# Patient Record
Sex: FEMALE | Race: WHITE | NOT HISPANIC OR LATINO | Employment: UNEMPLOYED | URBAN - METROPOLITAN AREA
[De-identification: names, ages, dates, MRNs, and addresses within clinical notes are randomized per-mention and may not be internally consistent; named-entity substitution may affect disease eponyms.]

---

## 2017-03-16 ENCOUNTER — APPOINTMENT (OUTPATIENT)
Dept: GENERAL RADIOLOGY | Facility: HOSPITAL | Age: 57
End: 2017-03-16

## 2017-03-16 ENCOUNTER — HOSPITAL ENCOUNTER (INPATIENT)
Facility: HOSPITAL | Age: 57
LOS: 6 days | Discharge: NURSING, REHAB OR LONG-TERM CARE | End: 2017-03-22
Attending: EMERGENCY MEDICINE | Admitting: HOSPITALIST

## 2017-03-16 DIAGNOSIS — J18.9 HAP (HOSPITAL-ACQUIRED PNEUMONIA): ICD-10-CM

## 2017-03-16 DIAGNOSIS — J96.21 ACUTE ON CHRONIC RESPIRATORY FAILURE WITH HYPOXIA (HCC): Primary | ICD-10-CM

## 2017-03-16 DIAGNOSIS — R53.1 GENERALIZED WEAKNESS: ICD-10-CM
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
## 2017-03-16 PROBLEM — E11.9 DIABETES MELLITUS (HCC): Status: ACTIVE | Noted: 2017-03-16

## 2017-03-16 PROBLEM — B19.20 HEPATITIS C: Status: ACTIVE | Noted: 2017-03-16

## 2017-03-16 PROBLEM — Z93.0 TRACHEOSTOMY IN PLACE (HCC): Status: ACTIVE | Noted: 2017-03-16

## 2017-03-16 PROBLEM — I48.91 ATRIAL FIBRILLATION (HCC): Status: ACTIVE | Noted: 2017-03-16

## 2017-03-16 PROBLEM — E78.5 HYPERLIPIDEMIA: Status: ACTIVE | Noted: 2017-03-16

## 2017-03-16 PROBLEM — N28.9 RENAL DISORDER: Status: ACTIVE | Noted: 2017-03-16

## 2017-03-16 PROBLEM — I10 HYPERTENSION: Status: ACTIVE | Noted: 2017-03-16

## 2017-03-16 PROBLEM — I50.9 CHF (CONGESTIVE HEART FAILURE) (HCC): Status: ACTIVE | Noted: 2017-03-16

## 2017-03-16 PROBLEM — G47.33 SLEEP APNEA, OBSTRUCTIVE: Status: ACTIVE | Noted: 2017-03-16

## 2017-03-16 LAB
ALBUMIN SERPL-MCNC: 3.8 G/DL (ref 3.5–5.2)
ALBUMIN/GLOB SERPL: 1 G/DL
ALP SERPL-CCNC: 93 U/L (ref 39–117)
ALT SERPL W P-5'-P-CCNC: 15 U/L (ref 1–33)
ANION GAP SERPL CALCULATED.3IONS-SCNC: 13 MMOL/L
ANION GAP SERPL CALCULATED.3IONS-SCNC: 13.9 MMOL/L
AST SERPL-CCNC: 14 U/L (ref 1–32)
B PERT DNA SPEC QL NAA+PROBE: NOT DETECTED
BASOPHILS # BLD AUTO: 0.04 10*3/MM3 (ref 0–0.2)
BASOPHILS # BLD AUTO: 0.05 10*3/MM3 (ref 0–0.2)
BASOPHILS NFR BLD AUTO: 0.5 % (ref 0–1.5)
BASOPHILS NFR BLD AUTO: 0.6 % (ref 0–1.5)
BILIRUB SERPL-MCNC: 0.2 MG/DL (ref 0.1–1.2)
BILIRUB UR QL STRIP: NEGATIVE
BUN BLD-MCNC: 49 MG/DL (ref 6–20)
BUN BLD-MCNC: 53 MG/DL (ref 6–20)
BUN/CREAT SERPL: 20.5 (ref 7–25)
BUN/CREAT SERPL: 23.2 (ref 7–25)
C PNEUM DNA NPH QL NAA+NON-PROBE: NOT DETECTED
CALCIUM SPEC-SCNC: 9.5 MG/DL (ref 8.6–10.5)
CALCIUM SPEC-SCNC: 9.6 MG/DL (ref 8.6–10.5)
CHLORIDE SERPL-SCNC: 98 MMOL/L (ref 98–107)
CHLORIDE SERPL-SCNC: 98 MMOL/L (ref 98–107)
CLARITY UR: ABNORMAL
CO2 SERPL-SCNC: 28 MMOL/L (ref 22–29)
CO2 SERPL-SCNC: 28.1 MMOL/L (ref 22–29)
COLOR UR: YELLOW
CREAT BLD-MCNC: 2.28 MG/DL (ref 0.57–1)
CREAT BLD-MCNC: 2.39 MG/DL (ref 0.57–1)
D-LACTATE SERPL-SCNC: 1.4 MMOL/L (ref 0.5–2)
DEPRECATED RDW RBC AUTO: 54.4 FL (ref 37–54)
DEPRECATED RDW RBC AUTO: 55.5 FL (ref 37–54)
EOSINOPHIL # BLD AUTO: 0.24 10*3/MM3 (ref 0–0.7)
EOSINOPHIL # BLD AUTO: 0.27 10*3/MM3 (ref 0–0.7)
EOSINOPHIL NFR BLD AUTO: 2.9 % (ref 0.3–6.2)
EOSINOPHIL NFR BLD AUTO: 3.6 % (ref 0.3–6.2)
ERYTHROCYTE [DISTWIDTH] IN BLOOD BY AUTOMATED COUNT: 16.9 % (ref 11.7–13)
ERYTHROCYTE [DISTWIDTH] IN BLOOD BY AUTOMATED COUNT: 17.1 % (ref 11.7–13)
FLUAV H1 2009 PAND RNA NPH QL NAA+PROBE: NOT DETECTED
FLUAV H1 HA GENE NPH QL NAA+PROBE: NOT DETECTED
FLUAV H3 RNA NPH QL NAA+PROBE: NOT DETECTED
FLUAV SUBTYP SPEC NAA+PROBE: NOT DETECTED
FLUBV RNA ISLT QL NAA+PROBE: NOT DETECTED
GFR SERPL CREATININE-BSD FRML MDRD: 21 ML/MIN/1.73
GFR SERPL CREATININE-BSD FRML MDRD: 22 ML/MIN/1.73
GLOBULIN UR ELPH-MCNC: 3.7 GM/DL
GLUCOSE BLD-MCNC: 131 MG/DL (ref 65–99)
GLUCOSE BLD-MCNC: 150 MG/DL (ref 65–99)
GLUCOSE BLDC GLUCOMTR-MCNC: 116 MG/DL (ref 70–130)
GLUCOSE BLDC GLUCOMTR-MCNC: 140 MG/DL (ref 70–130)
GLUCOSE BLDC GLUCOMTR-MCNC: 197 MG/DL (ref 70–130)
GLUCOSE UR STRIP-MCNC: NEGATIVE MG/DL
HADV DNA SPEC NAA+PROBE: NOT DETECTED
HBA1C MFR BLD: 4.9 % (ref 4.8–5.6)
HCOV 229E RNA SPEC QL NAA+PROBE: NOT DETECTED
HCOV HKU1 RNA SPEC QL NAA+PROBE: NOT DETECTED
HCOV NL63 RNA SPEC QL NAA+PROBE: NOT DETECTED
HCOV OC43 RNA SPEC QL NAA+PROBE: NOT DETECTED
HCT VFR BLD AUTO: 36.6 % (ref 35.6–45.5)
HCT VFR BLD AUTO: 37.2 % (ref 35.6–45.5)
HGB BLD-MCNC: 11.6 G/DL (ref 11.9–15.5)
HGB BLD-MCNC: 11.7 G/DL (ref 11.9–15.5)
HGB UR QL STRIP.AUTO: NEGATIVE
HMPV RNA NPH QL NAA+NON-PROBE: NOT DETECTED
HPIV1 RNA SPEC QL NAA+PROBE: NOT DETECTED
HPIV2 RNA SPEC QL NAA+PROBE: NOT DETECTED
HPIV3 RNA NPH QL NAA+PROBE: NOT DETECTED
HPIV4 P GENE NPH QL NAA+PROBE: NOT DETECTED
IMM GRANULOCYTES # BLD: 0 10*3/MM3 (ref 0–0.03)
IMM GRANULOCYTES # BLD: 0.03 10*3/MM3 (ref 0–0.03)
IMM GRANULOCYTES NFR BLD: 0 % (ref 0–0.5)
IMM GRANULOCYTES NFR BLD: 0.4 % (ref 0–0.5)
KETONES UR QL STRIP: NEGATIVE
LEUKOCYTE ESTERASE UR QL STRIP.AUTO: NEGATIVE
LYMPHOCYTES # BLD AUTO: 1.74 10*3/MM3 (ref 0.9–4.8)
LYMPHOCYTES # BLD AUTO: 1.78 10*3/MM3 (ref 0.9–4.8)
LYMPHOCYTES NFR BLD AUTO: 20.8 % (ref 19.6–45.3)
LYMPHOCYTES NFR BLD AUTO: 23.8 % (ref 19.6–45.3)
M PNEUMO IGG SER IA-ACNC: NOT DETECTED
MAGNESIUM SERPL-MCNC: 2 MG/DL (ref 1.6–2.6)
MCH RBC QN AUTO: 28 PG (ref 26.9–32)
MCH RBC QN AUTO: 28.2 PG (ref 26.9–32)
MCHC RBC AUTO-ENTMCNC: 31.5 G/DL (ref 32.4–36.3)
MCHC RBC AUTO-ENTMCNC: 31.7 G/DL (ref 32.4–36.3)
MCV RBC AUTO: 88.8 FL (ref 80.5–98.2)
MCV RBC AUTO: 89 FL (ref 80.5–98.2)
MONOCYTES # BLD AUTO: 0.5 10*3/MM3 (ref 0.2–1.2)
MONOCYTES # BLD AUTO: 0.52 10*3/MM3 (ref 0.2–1.2)
MONOCYTES NFR BLD AUTO: 6 % (ref 5–12)
MONOCYTES NFR BLD AUTO: 7 % (ref 5–12)
NEUTROPHILS # BLD AUTO: 4.86 10*3/MM3 (ref 1.9–8.1)
NEUTROPHILS # BLD AUTO: 5.79 10*3/MM3 (ref 1.9–8.1)
NEUTROPHILS NFR BLD AUTO: 65.1 % (ref 42.7–76)
NEUTROPHILS NFR BLD AUTO: 69.3 % (ref 42.7–76)
NITRITE UR QL STRIP: NEGATIVE
NT-PROBNP SERPL-MCNC: 468.9 PG/ML (ref 0–900)
PH UR STRIP.AUTO: <=5 [PH] (ref 5–8)
PLATELET # BLD AUTO: 217 10*3/MM3 (ref 140–500)
PLATELET # BLD AUTO: 266 10*3/MM3 (ref 140–500)
PMV BLD AUTO: 9.7 FL (ref 6–12)
PMV BLD AUTO: 9.7 FL (ref 6–12)
POTASSIUM BLD-SCNC: 4.4 MMOL/L (ref 3.5–5.2)
POTASSIUM BLD-SCNC: 4.5 MMOL/L (ref 3.5–5.2)
POTASSIUM BLD-SCNC: 4.8 MMOL/L (ref 3.5–5.2)
PROT SERPL-MCNC: 7.5 G/DL (ref 6–8.5)
PROT UR QL STRIP: NEGATIVE
RBC # BLD AUTO: 4.12 10*6/MM3 (ref 3.9–5.2)
RBC # BLD AUTO: 4.18 10*6/MM3 (ref 3.9–5.2)
RHINOVIRUS RNA SPEC NAA+PROBE: NOT DETECTED
RSV RNA NPH QL NAA+NON-PROBE: NOT DETECTED
SODIUM BLD-SCNC: 139 MMOL/L (ref 136–145)
SODIUM BLD-SCNC: 140 MMOL/L (ref 136–145)
SP GR UR STRIP: 1.02 (ref 1–1.03)
TROPONIN T SERPL-MCNC: <0.01 NG/ML (ref 0–0.03)
TSH SERPL DL<=0.05 MIU/L-ACNC: 2.48 MIU/ML (ref 0.27–4.2)
UROBILINOGEN UR QL STRIP: ABNORMAL
WBC NRBC COR # BLD: 7.47 10*3/MM3 (ref 4.5–10.7)
WBC NRBC COR # BLD: 8.35 10*3/MM3 (ref 4.5–10.7)

## 2017-03-16 PROCEDURE — 94799 UNLISTED PULMONARY SVC/PX: CPT

## 2017-03-16 PROCEDURE — 71010 HC CHEST PA OR AP: CPT

## 2017-03-16 PROCEDURE — 85025 COMPLETE CBC W/AUTO DIFF WBC: CPT | Performed by: HOSPITALIST

## 2017-03-16 PROCEDURE — 63710000001 INSULIN DETEMER PER 5 UNITS: Performed by: HOSPITALIST

## 2017-03-16 PROCEDURE — 97110 THERAPEUTIC EXERCISES: CPT

## 2017-03-16 PROCEDURE — 93005 ELECTROCARDIOGRAM TRACING: CPT | Performed by: PHYSICIAN ASSISTANT

## 2017-03-16 PROCEDURE — 80053 COMPREHEN METABOLIC PANEL: CPT | Performed by: PHYSICIAN ASSISTANT

## 2017-03-16 PROCEDURE — 83036 HEMOGLOBIN GLYCOSYLATED A1C: CPT | Performed by: HOSPITALIST

## 2017-03-16 PROCEDURE — 81003 URINALYSIS AUTO W/O SCOPE: CPT | Performed by: HOSPITALIST

## 2017-03-16 PROCEDURE — 93010 ELECTROCARDIOGRAM REPORT: CPT | Performed by: INTERNAL MEDICINE

## 2017-03-16 PROCEDURE — 87633 RESP VIRUS 12-25 TARGETS: CPT | Performed by: HOSPITALIST

## 2017-03-16 PROCEDURE — 83735 ASSAY OF MAGNESIUM: CPT | Performed by: HOSPITALIST

## 2017-03-16 PROCEDURE — 84484 ASSAY OF TROPONIN QUANT: CPT | Performed by: PHYSICIAN ASSISTANT

## 2017-03-16 PROCEDURE — 25010000002 PIPERACILLIN SOD-TAZOBACTAM PER 1 G: Performed by: HOSPITALIST

## 2017-03-16 PROCEDURE — 87486 CHLMYD PNEUM DNA AMP PROBE: CPT | Performed by: HOSPITALIST

## 2017-03-16 PROCEDURE — 36415 COLL VENOUS BLD VENIPUNCTURE: CPT

## 2017-03-16 PROCEDURE — 25010000002 PIPERACILLIN SOD-TAZOBACTAM PER 1 G: Performed by: PHYSICIAN ASSISTANT

## 2017-03-16 PROCEDURE — 87040 BLOOD CULTURE FOR BACTERIA: CPT | Performed by: PHYSICIAN ASSISTANT

## 2017-03-16 PROCEDURE — 82962 GLUCOSE BLOOD TEST: CPT

## 2017-03-16 PROCEDURE — 99285 EMERGENCY DEPT VISIT HI MDM: CPT

## 2017-03-16 PROCEDURE — 94640 AIRWAY INHALATION TREATMENT: CPT

## 2017-03-16 PROCEDURE — 87798 DETECT AGENT NOS DNA AMP: CPT | Performed by: HOSPITALIST

## 2017-03-16 PROCEDURE — 84132 ASSAY OF SERUM POTASSIUM: CPT | Performed by: HOSPITALIST

## 2017-03-16 PROCEDURE — 83605 ASSAY OF LACTIC ACID: CPT | Performed by: PHYSICIAN ASSISTANT

## 2017-03-16 PROCEDURE — 84443 ASSAY THYROID STIM HORMONE: CPT | Performed by: HOSPITALIST

## 2017-03-16 PROCEDURE — 25010000002 VANCOMYCIN: Performed by: PHYSICIAN ASSISTANT

## 2017-03-16 PROCEDURE — 63710000001 INSULIN ASPART PER 5 UNITS: Performed by: HOSPITALIST

## 2017-03-16 PROCEDURE — 36415 COLL VENOUS BLD VENIPUNCTURE: CPT | Performed by: HOSPITALIST

## 2017-03-16 PROCEDURE — 87581 M.PNEUMON DNA AMP PROBE: CPT | Performed by: HOSPITALIST

## 2017-03-16 PROCEDURE — 92610 EVALUATE SWALLOWING FUNCTION: CPT

## 2017-03-16 PROCEDURE — 25010000002 ENOXAPARIN PER 10 MG: Performed by: HOSPITALIST

## 2017-03-16 PROCEDURE — 97162 PT EVAL MOD COMPLEX 30 MIN: CPT

## 2017-03-16 PROCEDURE — 83880 ASSAY OF NATRIURETIC PEPTIDE: CPT | Performed by: PHYSICIAN ASSISTANT

## 2017-03-16 PROCEDURE — 85025 COMPLETE CBC W/AUTO DIFF WBC: CPT | Performed by: PHYSICIAN ASSISTANT

## 2017-03-16 RX ORDER — ALBUTEROL SULFATE 0.63 MG/3ML
1 SOLUTION RESPIRATORY (INHALATION) EVERY 4 HOURS PRN
COMMUNITY

## 2017-03-16 RX ORDER — BUDESONIDE 0.5 MG/2ML
0.5 INHALANT ORAL
Status: DISCONTINUED | OUTPATIENT
Start: 2017-03-16 | End: 2017-03-22 | Stop reason: HOSPADM

## 2017-03-16 RX ORDER — ONDANSETRON 4 MG/1
4 TABLET, FILM COATED ORAL EVERY 4 HOURS PRN
Status: DISCONTINUED | OUTPATIENT
Start: 2017-03-16 | End: 2017-03-22 | Stop reason: HOSPADM

## 2017-03-16 RX ORDER — TRAMADOL HYDROCHLORIDE 50 MG/1
50 TABLET ORAL EVERY 6 HOURS PRN
COMMUNITY
End: 2017-03-22 | Stop reason: HOSPADM

## 2017-03-16 RX ORDER — SODIUM CHLORIDE 0.9 % (FLUSH) 0.9 %
1-10 SYRINGE (ML) INJECTION AS NEEDED
Status: DISCONTINUED | OUTPATIENT
Start: 2017-03-16 | End: 2017-03-22 | Stop reason: HOSPADM

## 2017-03-16 RX ORDER — LEVOFLOXACIN 750 MG/1
750 TABLET ORAL DAILY
COMMUNITY
End: 2017-03-22 | Stop reason: HOSPADM

## 2017-03-16 RX ORDER — FAMOTIDINE 10 MG
10 TABLET ORAL 2 TIMES DAILY
Status: DISCONTINUED | OUTPATIENT
Start: 2017-03-16 | End: 2017-03-22 | Stop reason: HOSPADM

## 2017-03-16 RX ORDER — ACETAMINOPHEN 325 MG/1
650 TABLET ORAL EVERY 6 HOURS PRN
COMMUNITY

## 2017-03-16 RX ORDER — IPRATROPIUM BROMIDE AND ALBUTEROL SULFATE 2.5; .5 MG/3ML; MG/3ML
3 SOLUTION RESPIRATORY (INHALATION)
Status: DISCONTINUED | OUTPATIENT
Start: 2017-03-16 | End: 2017-03-19

## 2017-03-16 RX ORDER — SODIUM CHLORIDE 0.9 % (FLUSH) 0.9 %
10 SYRINGE (ML) INJECTION AS NEEDED
Status: DISCONTINUED | OUTPATIENT
Start: 2017-03-16 | End: 2017-03-22 | Stop reason: HOSPADM

## 2017-03-16 RX ORDER — POLYETHYLENE GLYCOL 3350 17 G/17G
17 POWDER, FOR SOLUTION ORAL DAILY
COMMUNITY

## 2017-03-16 RX ORDER — POLYETHYLENE GLYCOL 3350 17 G/17G
17 POWDER, FOR SOLUTION ORAL DAILY
Status: DISCONTINUED | OUTPATIENT
Start: 2017-03-16 | End: 2017-03-22 | Stop reason: HOSPADM

## 2017-03-16 RX ORDER — DILTIAZEM HYDROCHLORIDE 60 MG/1
60 TABLET, FILM COATED ORAL 3 TIMES DAILY
COMMUNITY

## 2017-03-16 RX ORDER — DEXTROSE MONOHYDRATE 25 G/50ML
25 INJECTION, SOLUTION INTRAVENOUS
Status: DISCONTINUED | OUTPATIENT
Start: 2017-03-16 | End: 2017-03-22 | Stop reason: HOSPADM

## 2017-03-16 RX ORDER — ARFORMOTEROL TARTRATE 15 UG/2ML
SOLUTION RESPIRATORY (INHALATION)
COMMUNITY

## 2017-03-16 RX ORDER — NICOTINE POLACRILEX 4 MG
15 LOZENGE BUCCAL
Status: DISCONTINUED | OUTPATIENT
Start: 2017-03-16 | End: 2017-03-22 | Stop reason: HOSPADM

## 2017-03-16 RX ORDER — DILTIAZEM HYDROCHLORIDE 60 MG/1
60 TABLET, FILM COATED ORAL 3 TIMES DAILY
Status: DISCONTINUED | OUTPATIENT
Start: 2017-03-16 | End: 2017-03-22 | Stop reason: HOSPADM

## 2017-03-16 RX ORDER — ONDANSETRON 4 MG/1
4 TABLET, FILM COATED ORAL EVERY 4 HOURS PRN
COMMUNITY

## 2017-03-16 RX ORDER — ASPIRIN 81 MG/1
81 TABLET ORAL DAILY
Status: DISCONTINUED | OUTPATIENT
Start: 2017-03-16 | End: 2017-03-22 | Stop reason: HOSPADM

## 2017-03-16 RX ORDER — LANTHANUM CARBONATE 500 MG/1
500 TABLET, CHEWABLE ORAL
Status: DISCONTINUED | OUTPATIENT
Start: 2017-03-16 | End: 2017-03-22 | Stop reason: HOSPADM

## 2017-03-16 RX ORDER — FAMOTIDINE 20 MG/1
20 TABLET, FILM COATED ORAL 2 TIMES DAILY
Status: DISCONTINUED | OUTPATIENT
Start: 2017-03-16 | End: 2017-03-16

## 2017-03-16 RX ORDER — ASPIRIN 81 MG/1
81 TABLET ORAL DAILY
COMMUNITY

## 2017-03-16 RX ORDER — HALOPERIDOL 5 MG/1
2.5 TABLET ORAL 2 TIMES DAILY PRN
COMMUNITY
End: 2017-03-22 | Stop reason: HOSPADM

## 2017-03-16 RX ORDER — ACETAMINOPHEN 325 MG/1
650 TABLET ORAL EVERY 6 HOURS PRN
Status: DISCONTINUED | OUTPATIENT
Start: 2017-03-16 | End: 2017-03-22 | Stop reason: HOSPADM

## 2017-03-16 RX ORDER — BUDESONIDE 0.5 MG/2ML
0.5 INHALANT ORAL
COMMUNITY

## 2017-03-16 RX ORDER — FAMOTIDINE 20 MG/1
20 TABLET, FILM COATED ORAL 2 TIMES DAILY
COMMUNITY

## 2017-03-16 RX ORDER — GUAIFENESIN 600 MG/1
600 TABLET, EXTENDED RELEASE ORAL 2 TIMES DAILY
Status: DISCONTINUED | OUTPATIENT
Start: 2017-03-16 | End: 2017-03-22 | Stop reason: HOSPADM

## 2017-03-16 RX ORDER — ALBUTEROL SULFATE 0.63 MG/3ML
1 SOLUTION RESPIRATORY (INHALATION) EVERY 6 HOURS SCHEDULED
COMMUNITY

## 2017-03-16 RX ORDER — TRAMADOL HYDROCHLORIDE 50 MG/1
50 TABLET ORAL EVERY 6 HOURS PRN
Status: DISCONTINUED | OUTPATIENT
Start: 2017-03-16 | End: 2017-03-22 | Stop reason: HOSPADM

## 2017-03-16 RX ORDER — ARFORMOTEROL TARTRATE 15 UG/2ML
15 SOLUTION RESPIRATORY (INHALATION)
Status: DISCONTINUED | OUTPATIENT
Start: 2017-03-16 | End: 2017-03-22 | Stop reason: HOSPADM

## 2017-03-16 RX ORDER — FUROSEMIDE 20 MG/1
20 TABLET ORAL DAILY
COMMUNITY

## 2017-03-16 RX ADMIN — IPRATROPIUM BROMIDE AND ALBUTEROL SULFATE 3 ML: .5; 3 SOLUTION RESPIRATORY (INHALATION) at 15:49

## 2017-03-16 RX ADMIN — INSULIN DETEMIR 20 UNITS: 100 INJECTION, SOLUTION SUBCUTANEOUS at 11:44

## 2017-03-16 RX ADMIN — FAMOTIDINE 20 MG: 20 TABLET, FILM COATED ORAL at 11:51

## 2017-03-16 RX ADMIN — IPRATROPIUM BROMIDE AND ALBUTEROL SULFATE 3 ML: .5; 3 SOLUTION RESPIRATORY (INHALATION) at 10:51

## 2017-03-16 RX ADMIN — FAMOTIDINE 10 MG: 20 TABLET, FILM COATED ORAL at 18:43

## 2017-03-16 RX ADMIN — TAZOBACTAM SODIUM AND PIPERACILLIN SODIUM 4.5 G: 500; 4 INJECTION, SOLUTION INTRAVENOUS at 03:57

## 2017-03-16 RX ADMIN — ARFORMOTEROL TARTRATE 15 MCG: 15 SOLUTION RESPIRATORY (INHALATION) at 22:02

## 2017-03-16 RX ADMIN — INSULIN DETEMIR 20 UNITS: 100 INJECTION, SOLUTION SUBCUTANEOUS at 18:43

## 2017-03-16 RX ADMIN — POLYETHYLENE GLYCOL 3350 17 G: 17 POWDER, FOR SOLUTION ORAL at 11:42

## 2017-03-16 RX ADMIN — DILTIAZEM HYDROCHLORIDE 60 MG: 60 TABLET, FILM COATED ORAL at 11:43

## 2017-03-16 RX ADMIN — LANTHANUM CARBONATE 500 MG: 500 TABLET, CHEWABLE ORAL at 11:43

## 2017-03-16 RX ADMIN — GUAIFENESIN 600 MG: 600 TABLET, EXTENDED RELEASE ORAL at 18:43

## 2017-03-16 RX ADMIN — ENOXAPARIN SODIUM 30 MG: 30 INJECTION SUBCUTANEOUS at 11:44

## 2017-03-16 RX ADMIN — BUDESONIDE 0.5 MG: 0.5 INHALANT RESPIRATORY (INHALATION) at 22:02

## 2017-03-16 RX ADMIN — TAZOBACTAM SODIUM AND PIPERACILLIN SODIUM 3.38 G: 375; 3 INJECTION, SOLUTION INTRAVENOUS at 23:32

## 2017-03-16 RX ADMIN — METOPROLOL TARTRATE 25 MG: 25 TABLET ORAL at 18:43

## 2017-03-16 RX ADMIN — ASPIRIN 81 MG: 81 TABLET ORAL at 11:42

## 2017-03-16 RX ADMIN — GUAIFENESIN 600 MG: 600 TABLET, EXTENDED RELEASE ORAL at 11:42

## 2017-03-16 RX ADMIN — DILTIAZEM HYDROCHLORIDE 60 MG: 60 TABLET, FILM COATED ORAL at 16:18

## 2017-03-16 RX ADMIN — METOPROLOL TARTRATE 25 MG: 25 TABLET ORAL at 11:42

## 2017-03-16 RX ADMIN — IPRATROPIUM BROMIDE AND ALBUTEROL SULFATE 3 ML: .5; 3 SOLUTION RESPIRATORY (INHALATION) at 06:56

## 2017-03-16 RX ADMIN — TAZOBACTAM SODIUM AND PIPERACILLIN SODIUM 3.38 G: 375; 3 INJECTION, SOLUTION INTRAVENOUS at 16:18

## 2017-03-16 RX ADMIN — INSULIN ASPART 2 UNITS: 100 INJECTION, SOLUTION INTRAVENOUS; SUBCUTANEOUS at 14:22

## 2017-03-16 RX ADMIN — VANCOMYCIN HYDROCHLORIDE 3000 MG: 1 INJECTION, POWDER, LYOPHILIZED, FOR SOLUTION INTRAVENOUS at 04:41

## 2017-03-16 RX ADMIN — LANTHANUM CARBONATE 500 MG: 500 TABLET, CHEWABLE ORAL at 18:43

## 2017-03-16 NOTE — PROGRESS NOTES
"Pharmacokinetic Consult - Vancomycin Dosing (Initial Note)  Lucy Hellerall has been consulted for pharmacy to dose vancomycin for pneumonia per Dr. Lemon's request. Also receiving extended infusion Zosyn as part of the antimicrobial regimen.    - HPI: Presents from rehabilitation facility; on Levaquin PTA    Goal trough: 15-20 mg/L     Relevant clinical data and objective history reviewed:  56 y.o. female 66\" (167.6 cm) (!) 322 lb (146 kg)    Past Medical History   Diagnosis Date   • Anemia    • Atrial fibrillation    • CHF (congestive heart failure)    • Diabetes mellitus    • Hepatitis C    • Hyperlipidemia    • Hypertension    • Renal disorder    • Sleep apnea, obstructive    • Tracheostomy in place      CREATININE   Date Value Ref Range Status   03/16/2017 2.28 (H) 0.57 - 1.00 mg/dL Final   03/16/2017 2.39 (H) 0.57 - 1.00 mg/dL Final     BUN   Date Value Ref Range Status   03/16/2017 53 (H) 6 - 20 mg/dL Final     Estimated Creatinine Clearance: 41.9 mL/min (by C-G formula based on Cr of 2.28).    Lab Results   Component Value Date    WBC 7.47 03/16/2017     Temp Readings from Last 3 Encounters:   03/16/17 98 °F (36.7 °C) (Oral)      Baseline culture/source/susceptibility:   - Blood cultures pending    Assessment/Plan  - CXR reports potential pneumonia  - Labs reviewed: WBC within normal limits, lactic acid 1.4  - Renal function: SCr/BUN levels remain elevated  - No h/o vancomycin administration per the EPIC chart  - Patient administered one-time 17.6 mg/kg vancomycin dose (3000 mg) this morning @ 0441    Therefore, recommend the following;  1. Begin vancomycin 2250 mg (15 mg/kg) IV q24h  2. Will plan to obtain trough level 30 min prior to the 4th total vancomycin dose or sooner if worsening in renal function.    Pharmacy will continue to follow daily while on vancomycin and adjust as needed.     Fadumo Islas, [facility]    "

## 2017-03-16 NOTE — H&P
HISTORY AND PHYSICAL   [facility]        Patient Identification:  Name: Lucy Sevilla  Age: 56 y.o.  Sex: female  :  1960  MRN: [id number]                     Primary Care Physician: Eloy Cancino MD    Chief Complaint:  Short of air and coughing    History of Present Illness:         The patient is a 56-year-old white female with history of sleep apnea, atrial fibrillation, CHF, diabetes mellitus, hepatitis C, hypertension, hyperlipidemia and renal disorder who usually goes to a lot of on hospital but now is been skilled nursing facility and was noted to be more short of air over the past few days with cough and lung congestion.  She was at the [facility] and was sent in for further treatment with worsening shortness of air.  She was on 15 L trach mask has had tracheostomy for quite some time.  She's also been on G-tube but has been able to eat regular type food.  She denies having any fevers or chills.  She says she's not had any swallowing problems.  She's been weak and is getting therapy but is not been able to walk.  The patient was evaluated in the ER and noted to have pneumonia on x-ray.  She's being admitted for treatment of healthcare acquired pneumonia and acute on chronic respiratory failure.  She's not had any nausea or vomiting.  She's been taking care of by Dr. Hall at [place] for her chronic pulmonary problems and sleep apnea.    Past Medical History:  Past Medical History   Diagnosis Date   • Anemia    • Atrial fibrillation    • CHF (congestive heart failure)    • Diabetes mellitus    • Hepatitis C    • Hyperlipidemia    • Hypertension    • Renal disorder    • Sleep apnea, obstructive    • Tracheostomy in place      Past Surgical History:  Past Surgical History   Procedure Laterality Date   • Tracheostomy     • Gtube replacement        Home Meds:  No current facility-administered medications on file prior to encounter.      No current outpatient prescriptions on  file prior to encounter.     Prescriptions Prior to Admission   Medication Sig Dispense Refill Last Dose   • acetaminophen (TYLENOL) 325 MG tablet 650 mg by Per G Tube route Every 6 (Six) Hours As Needed for Mild Pain (1-3).      • albuterol (ACCUNEB) 0.63 MG/3ML nebulizer solution Take 1 ampule by nebulization Every 4 (Four) Hours As Needed for Wheezing.      • albuterol (ACCUNEB) 0.63 MG/3ML nebulizer solution Take 1 ampule by nebulization Every 6 (Six) Hours. 1 dose via trach every six hours related to chronic obstructive pulmonary disease      • arformoterol (BROVANA) 15 MCG/2ML nebulizer solution Take  by nebulization 2 (Two) Times a Day. One dose via trach every twelve hours      • aspirin 81 MG EC tablet 81 mg by Per G Tube route Daily.      • B Complex-C-Folic Acid (JOY-MARLENY PO) 1 tablet by Per G Tube route Daily.      • budesonide (PULMICORT) 0.5 MG/2ML nebulizer solution Take 0.5 mg by nebulization 2 (Two) Times a Day. 1 puff inhale orally two times a day      • diltiaZEM (CARDIZEM) 60 MG tablet 60 mg by Per G Tube route 3 (Three) Times a Day.      • enoxaparin (LOVENOX) 40 MG/0.4ML solution syringe Inject 40 mg under the skin Daily.      • famotidine (PEPCID) 20 MG tablet 20 mg by Per G Tube route 2 (Two) Times a Day.      • furosemide (LASIX) 20 MG tablet 20 mg by Per G Tube route Daily.      • haloperidol (HALDOL) 5 MG tablet 2.5 mg by Per G Tube route 2 (Two) Times a Day As Needed for Agitation. Give 1/2 tablet every twelve hours as needed for agitation      • insulin detemir (LEVEMIR) 100 UNIT/ML injection Inject 20 Units under the skin 2 (Two) Times a Day.      • insulin NPH-insulin regular (NOVOLIN 70/30) (70-30) 100 UNIT/ML injection Inject  under the skin 2 (Two) Times a Day With Meals. Sliding scale      • ipratropium-albuterol (COMBIVENT)  MCG/ACT inhaler Inhale 1 puff Every 6 (Six) Hours As Needed for Wheezing. 1 dose via trach every six hours as needed for SOA      • Lanthanum Carbonate  750 MG pack 750 mg by Per G Tube route Every 8 (Eight) Hours.      • levoFLOXacin (LEVAQUIN) 750 MG tablet 750 mg by Per G Tube route Daily.      • metoprolol tartrate (LOPRESSOR) 25 MG tablet 25 mg by Per G Tube route 2 (Two) Times a Day.      • ondansetron (ZOFRAN) 4 MG tablet 4 mg by Per G Tube route Every 4 (Four) Hours As Needed for Nausea or Vomiting.      • polyethylene glycol (MIRALAX) packet 17 g by Per G Tube route Daily.      • traMADol (ULTRAM) 50 MG tablet 50 mg by Per G Tube route Every 6 (Six) Hours As Needed for Moderate Pain (4-6).      • tuberculin (TUBERSOL) 5 UNIT/0.1ML injection Inject 5 Units into the skin Every Night.          Allergies:  Allergies   Allergen Reactions   • Codeine      Immunizations:    There is no immunization history on file for this patient.  Social History:   Social History     Social History Narrative   • No narrative on file     Social History     Social History   • Marital status:      Spouse name: N/A   • Number of children: N/A   • Years of education: N/A     Occupational History   • Not on file.     Social History Main Topics   • Smoking status: Former Smoker   • Smokeless tobacco: Not on file   • Alcohol use No   • Drug use: No   • Sexual activity: Not on file     Other Topics Concern   • Not on file     Social History Narrative   • No narrative on file       Family History:  Family History   Problem Relation Age of Onset   • COPD Mother    • Heart disease Mother         Review of Systems  See history of present illness and past medical history.  Patient denies headache, dizziness, syncope, falls, trauma, change in vision, change in hearing, change in taste, changes in weight, changes in appetite, focal weakness, numbness, or paresthesia.  Patient denies chest pain, palpitations,  orthopnea, PND, cough, sinus pressure, rhinorrhea, epistaxis, hemoptysis, nausea, vomiting,hematemesis, diarrhea, constipation or hematchezia.  Denies cold or heat intolerance,  "polydipsia, polyuria, polyphagia. Denies hematuria, pyuria, dysuria, hesitancy, frequency or urgency.   Denies fever, chills, sweats, night sweats.   Remainder of ROS is negative.    Objective:  tMax 24 hrs: Temp (24hrs), Av.9 °F (36.6 °C), Min:97.9 °F (36.6 °C), Max:97.9 °F (36.6 °C)    Vitals Ranges:   Temp:  [97.9 °F (36.6 °C)] 97.9 °F (36.6 °C)  Heart Rate:  [] 95  Resp:  [18-24] 18  BP: (122-138)/(60-94) 126/74      Exam:  Visit Vitals   • /74 (Patient Position: Sitting)   • Pulse 95   • Temp 97.9 °F (36.6 °C)   • Resp 18   • Ht 66\" (167.6 cm)   • Wt (!) 374 lb (170 kg)   • SpO2 98%   • BMI 60.37 kg/m2       General Appearance:    Alert, cooperative, no distress, appears stated age, obese    Head:    Normocephalic, without obvious abnormality, atraumatic   Eyes:    PERRL, conjunctiva/corneas clear, EOM's intact, both eyes   Ears:    Normal external ear canals, both ears   Nose:   Nares normal, septum midline, mucosa normal, no drainage    or sinus tenderness   Throat:   Lips, mucosa, and tongue normal   Neck:   Supple, symmetrical, trachea midline, no adenopathy; tracheostomy in place     thyroid:  no enlargement/tenderness/nodules; no carotid    bruit or JVD   Back:     Symmetric, no curvature, ROM normal, no CVA tenderness   Lungs:     diffuse rales and rhonchi bilaterally, respirations unlabored   Chest Wall:    No tenderness or deformity    Heart:    Regular rate and rhythm, S1 and S2 normal, no murmur, rub   or gallop   Abdomen:     Soft, non-tender, bowel sounds active all four quadrants,     no masses, no hepatomegaly, no splenomegaly, PEG tube in place    Extremities:   Extremities normal, atraumatic, no cyanosis or edema   Pulses:   2+ and symmetric all extremities   Skin:   Skin color, texture, turgor normal, no rashes or lesions   Lymph nodes:   Cervical, supraclavicular, and axillary nodes normal   Neurologic:   CNII-XII intact, normal strength, sensation intact throughout    "   .    Data Review:  Lab Results (last 72 hours)     Procedure Component Value Units Date/Time    CBC & Differential [[id number]] Collected:  03/16/17 0232    Specimen:  Blood Updated:  03/16/17 0300    Narrative:       The following orders were created for panel order CBC & Differential.  Procedure                               Abnormality         Status                     ---------                               -----------         ------                     CBC Auto Differential[[id number]]         Abnormal            Final result                 Please view results for these tests on the individual orders.    CBC Auto Differential [[id number]]  (Abnormal) Collected:  03/16/17 0232    Specimen:  Blood Updated:  03/16/17 0300     WBC 8.35 10*3/mm3      RBC 4.18 10*6/mm3      Hemoglobin 11.7 (L) g/dL      Hematocrit 37.2 %      MCV 89.0 fL      MCH 28.0 pg      MCHC 31.5 (L) g/dL      RDW 17.1 (H) %      RDW-SD 55.5 (H) fl      MPV 9.7 fL      Platelets 266 10*3/mm3      Neutrophil % 69.3 %      Lymphocyte % 20.8 %      Monocyte % 6.0 %      Eosinophil % 2.9 %      Basophil % 0.6 %      Immature Grans % 0.4 %      Neutrophils, Absolute 5.79 10*3/mm3      Lymphocytes, Absolute 1.74 10*3/mm3      Monocytes, Absolute 0.50 10*3/mm3      Eosinophils, Absolute 0.24 10*3/mm3      Basophils, Absolute 0.05 10*3/mm3      Immature Grans, Absolute 0.03 10*3/mm3     Comprehensive Metabolic Panel [[id number]]  (Abnormal) Collected:  03/16/17 0232    Specimen:  Blood Updated:  03/16/17 0304     Glucose 150 (H) mg/dL      BUN 49 (H) mg/dL      Creatinine 2.39 (H) mg/dL      Sodium 139 mmol/L      Potassium 4.5 mmol/L      Chloride 98 mmol/L      CO2 28.0 mmol/L      Calcium 9.6 mg/dL      Total Protein 7.5 g/dL      Albumin 3.80 g/dL      ALT (SGPT) 15 U/L      AST (SGOT) 14 U/L      Alkaline Phosphatase 93 U/L      Total Bilirubin 0.2 mg/dL      eGFR Non African Amer 21 (L) mL/min/1.73      Globulin 3.7 gm/dL      A/G Ratio 1.0 g/dL       BUN/Creatinine Ratio 20.5      Anion Gap 13.0 mmol/L     Troponin [[id number]]  (Normal) Collected:  03/16/17 0232    Specimen:  Blood Updated:  03/16/17 0304     Troponin T <0.010 ng/mL     Narrative:       Troponin T Reference Ranges:  Less than 0.03 ng/mL:    Negative for AMI  0.03 to 0.09 ng/mL:      Indeterminant for AMI  Greater than 0.09 ng/mL: Positive for AMI    Blood Culture [[id number]] Collected:  03/16/17 0348    Specimen:  Blood from Arm, Left Updated:  03/16/17 0354    BNP [[id number]]  (Normal) Collected:  03/16/17 0232    Specimen:  Blood Updated:  03/16/17 0408     proBNP 468.9 pg/mL     Narrative:       Among patients with dyspnea, NT-proBNP is highly sensitive for the detection of acute congestive heart failure. In addition NT-proBNP of <300 pg/ml effectively rules out acute congestive heart failure with 99% negative predictive value.    Lactic Acid, Plasma [[id number]]  (Normal) Collected:  03/16/17 0348    Specimen:  Blood from Arm, Left Updated:  03/16/17 0412     Lactate 1.4 mmol/L     Blood Culture [[id number]] Collected:  03/16/17 0419    Specimen:  Blood from Arm, Right Updated:  03/16/17 0421                Results from last 7 days  Lab Units 03/16/17  0232   HEMOGLOBIN A1C % 4.90      Imaging Results (all)     Procedure Component Value Units Date/Time    XR Chest 1 View [[id number]]      Updated:  03/16/17 0247        Patient Active Problem List   Diagnosis Code   • Acute on chronic respiratory failure with hypoxia J96.21   • Tracheostomy in place Z93.0   • Hepatitis C B19.20   • Diabetes mellitus E11.9   • Hyperlipidemia E78.5   • Sleep apnea, obstructive G47.33   • Hypertension I10   • Atrial fibrillation I48.91   • CHF (congestive heart failure) I50.9   • Renal disorder N28.9   • Pneumonia J18.9       Assessment:  Principal Problem:    Acute on chronic respiratory failure with hypoxia  Active Problems:    Tracheostomy in place    Hepatitis C    Diabetes mellitus    Hyperlipidemia    Sleep apnea,  obstructive    Hypertension    Atrial fibrillation    CHF (congestive heart failure)    Renal disorder    Pneumonia      Plan:  The patient's admitted to hospital and cultures of been obtained.  Patient will be started on broad-spectrum antibiotics for healthcare acquired pneumonia.  We'll continue with oxygen bronchodilators and consult pulmonary medicine.    Cedric Lemon MD  3/16/2017  6:48 AM

## 2017-03-16 NOTE — ED PROVIDER NOTES
" EMERGENCY DEPARTMENT ENCOUNTER    CHIEF COMPLAINT  Chief Complaint: Dyspnea  History given by: Patient, Family  History limited by:  Vague historian  Room Number: 01/01  PMD: Eloy Cancino MD      HPI:  Pt is a 56 y.o. female who presents complaining of dyspnea that onset a few a days ago. Pt is currently staying in rehab facility after being admitted to [facility] and having a tracheostomy placed. Pt was last seen by family at 2100 last night and reported having some dyspnea. She had trache suction performed at that time. Pt has been taking Levaquin for recent trache infection.    Duration:  2 days  Onset: gradual  Timing: constant  Quality: \"short of breath\"  Intensity/Severity: moderate  Progression: unchanged  Associated Symptoms: none  Aggravating Factors: none  Alleviating Factors: none  Previous Episodes: hx of CHF  Treatment before arrival: has been taking Levaquin for trache infection    PAST MEDICAL HISTORY  Active Ambulatory Problems     Diagnosis Date Noted   • No Active Ambulatory Problems     Resolved Ambulatory Problems     Diagnosis Date Noted   • No Resolved Ambulatory Problems     Past Medical History   Diagnosis Date   • Anemia    • Atrial fibrillation    • CHF (congestive heart failure)    • Diabetes mellitus    • Hepatitis C    • Hyperlipidemia    • Hypertension    • Renal disorder    • Sleep apnea, obstructive    • Tracheostomy in place        PAST SURGICAL HISTORY  Past Surgical History   Procedure Laterality Date   • Tracheostomy     • Gtube replacement         FAMILY HISTORY  History reviewed. No pertinent family history.    SOCIAL HISTORY  Social History     Social History   • Marital status:      Spouse name: N/A   • Number of children: N/A   • Years of education: N/A     Occupational History   • Not on file.     Social History Main Topics   • Smoking status: Not on file   • Smokeless tobacco: Not on file   • Alcohol use Not on file   • Drug use: Not on file   • Sexual activity: " Not on file     Other Topics Concern   • Not on file     Social History Narrative   • No narrative on file       ALLERGIES  Codeine    REVIEW OF SYSTEMS  Review of Systems   Constitutional: Negative for fever.   HENT: Negative for sore throat.    Eyes: Negative.    Respiratory: Positive for shortness of breath. Negative for cough.    Cardiovascular: Negative for chest pain.   Gastrointestinal: Negative for abdominal pain, diarrhea and vomiting.   Genitourinary: Negative for dysuria.   Musculoskeletal: Negative for neck pain.   Skin: Negative for rash.   Allergic/Immunologic: Negative.    Neurological: Negative for weakness, numbness and headaches.   Hematological: Negative.    Psychiatric/Behavioral: Negative.    All other systems reviewed and are negative.      PHYSICAL EXAM  ED Triage Vitals   Temp Heart Rate Resp BP SpO2   03/16/17 0159 03/16/17 0159 03/16/17 0159 03/16/17 0159 03/16/17 0159   97.9 °F (36.6 °C) 72 24 138/68 94 %      Temp src Heart Rate Source Patient Position BP Location FiO2 (%)   -- -- -- -- --              Physical Exam   Constitutional: She is oriented to person, place, and time and well-developed, well-nourished, and in no distress. No distress.   HENT:   Head: Normocephalic and atraumatic.   Eyes: EOM are normal. Pupils are equal, round, and reactive to light.   Neck: Normal range of motion. Neck supple.   Cardiovascular: Normal rate, regular rhythm and normal heart sounds.    Pulmonary/Chest: She is in respiratory distress (moderate). She has rhonchi ( moderate) in the right upper field, the right middle field, the right lower field, the left upper field, the left middle field and the left lower field.   Abdominal: Soft. There is no tenderness. There is no rebound and no guarding.   Musculoskeletal: Normal range of motion. She exhibits edema ( 1+ BLE).   Neurological: She is alert and oriented to person, place, and time. She has normal sensation and normal strength.   Skin: Skin is warm  and dry. No rash noted.   Psychiatric: Mood and affect normal.   Nursing note and vitals reviewed.      LAB RESULTS  Lab Results (last 24 hours)     Procedure Component Value Units Date/Time    CBC & Differential [[id number]] Collected:  03/16/17 0232    Specimen:  Blood Updated:  03/16/17 0300    Narrative:       The following orders were created for panel order CBC & Differential.  Procedure                               Abnormality         Status                     ---------                               -----------         ------                     CBC Auto Differential[[id number]]         Abnormal            Final result                 Please view results for these tests on the individual orders.    Comprehensive Metabolic Panel [[id number]]  (Abnormal) Collected:  03/16/17 0232    Specimen:  Blood Updated:  03/16/17 0304     Glucose 150 (H) mg/dL      BUN 49 (H) mg/dL      Creatinine 2.39 (H) mg/dL      Sodium 139 mmol/L      Potassium 4.5 mmol/L      Chloride 98 mmol/L      CO2 28.0 mmol/L      Calcium 9.6 mg/dL      Total Protein 7.5 g/dL      Albumin 3.80 g/dL      ALT (SGPT) 15 U/L      AST (SGOT) 14 U/L      Alkaline Phosphatase 93 U/L      Total Bilirubin 0.2 mg/dL      eGFR Non African Amer 21 (L) mL/min/1.73      Globulin 3.7 gm/dL      A/G Ratio 1.0 g/dL      BUN/Creatinine Ratio 20.5      Anion Gap 13.0 mmol/L     Troponin [[id number]]  (Normal) Collected:  03/16/17 0232    Specimen:  Blood Updated:  03/16/17 0304     Troponin T <0.010 ng/mL     Narrative:       Troponin T Reference Ranges:  Less than 0.03 ng/mL:    Negative for AMI  0.03 to 0.09 ng/mL:      Indeterminant for AMI  Greater than 0.09 ng/mL: Positive for AMI    CBC Auto Differential [[id number]]  (Abnormal) Collected:  03/16/17 0232    Specimen:  Blood Updated:  03/16/17 0300     WBC 8.35 10*3/mm3      RBC 4.18 10*6/mm3      Hemoglobin 11.7 (L) g/dL      Hematocrit 37.2 %      MCV 89.0 fL      MCH 28.0 pg      MCHC 31.5 (L) g/dL      RDW 17.1  (H) %      RDW-SD 55.5 (H) fl      MPV 9.7 fL      Platelets 266 10*3/mm3      Neutrophil % 69.3 %      Lymphocyte % 20.8 %      Monocyte % 6.0 %      Eosinophil % 2.9 %      Basophil % 0.6 %      Immature Grans % 0.4 %      Neutrophils, Absolute 5.79 10*3/mm3      Lymphocytes, Absolute 1.74 10*3/mm3      Monocytes, Absolute 0.50 10*3/mm3      Eosinophils, Absolute 0.24 10*3/mm3      Basophils, Absolute 0.05 10*3/mm3      Immature Grans, Absolute 0.03 10*3/mm3     BNP [[id number]]  (Normal) Collected:  03/16/17 0232    Specimen:  Blood Updated:  03/16/17 0408     proBNP 468.9 pg/mL     Narrative:       Among patients with dyspnea, NT-proBNP is highly sensitive for the detection of acute congestive heart failure. In addition NT-proBNP of <300 pg/ml effectively rules out acute congestive heart failure with 99% negative predictive value.    Blood Culture [[id number]] Collected:  03/16/17 0348    Specimen:  Blood from Arm, Left Updated:  03/16/17 0354    Lactic Acid, Plasma [[id number]]  (Normal) Collected:  03/16/17 0348    Specimen:  Blood from Arm, Left Updated:  03/16/17 0412     Lactate 1.4 mmol/L           I ordered the above labs and reviewed the results    RADIOLOGY  XR Chest 1 View - Developing L lower lobe pneumonia        I ordered the above noted radiological studies. Interpreted by radiologist. Reviewed by me in PACS.       PROCEDURES  Procedures  EKG         EKG time: 0302  Rhythm/Rate: NSR, 90BPM  P waves and AL: nml  QRS, axis: nml   ST and T waves: nml     Interpreted Contemporaneously by me, independently viewed  No prior EKG for comparison       PROGRESS AND CONSULTS  ED Course   Time 3:59 AM  Discussed case with Dr Lemon (Hospitalist)  Reviewed history, exam, results and treatments.  Discussed concerns and plan of care. Dr Lemon accepts pt to be admitted to telemetry.  4:07 AM  Dr. Emmanuel has seen and evaluated pt and agrees with tx plan.         MEDICAL DECISION MAKING    MDM  Number of Diagnoses or  Management Options  Acute on chronic respiratory failure with hypoxia:   HAP (hospital-acquired pneumonia):      Amount and/or Complexity of Data Reviewed  Clinical lab tests: ordered and reviewed  Tests in the radiology section of CPT®: ordered and reviewed (CXR: Developing L lower lobe pneumonia)  Tests in the medicine section of CPT®: reviewed and ordered (EKG - See EKG procedure note  )  Discuss the patient with other providers: yes (Dr. Lemon)           DIAGNOSIS  Final diagnoses:   Acute on chronic respiratory failure with hypoxia   HAP (hospital-acquired pneumonia)       DISPOSITION  ADMISSION    Discussed treatment plan and reason for admission with pt/family and admitting physician.  Pt/family voiced understanding of the plan for admission for further testing/treatment as needed.           Latest Documented Vital Signs:  As of 4:15 AM  BP- 126/73 HR- 90 Temp- 97.9 °F (36.6 °C) O2 sat- 96%    --  Documentation assistance provided by justin Hay for Fransisco Herbert PA-C.  Information recorded by the scribe was done at my direction and has been verified and validated by me.       Christopher Hay  03/16/17 0415       CAROLE Fiore  03/16/17 0419

## 2017-03-16 NOTE — PLAN OF CARE
Problem: Patient Care Overview (Adult)  Goal: Plan of Care Review  Outcome: Ongoing (interventions implemented as appropriate)    03/16/17 0835   Coping/Psychosocial Response Interventions   Plan Of Care Reviewed With patient   Patient Care Overview   Progress progress toward functional goals as expected       Goal: Adult Individualization and Mutuality  Outcome: Ongoing (interventions implemented as appropriate)  Goal: Discharge Needs Assessment  Outcome: Ongoing (interventions implemented as appropriate)    03/16/17 0835   Discharge Needs Assessment   Concerns To Be Addressed no discharge needs identified   Readmission Within The Last 30 Days unable to assess   Discharge Facility/Level Of Care Needs assisted living facility;acute rehab  ([facility])         Problem: Respiratory Insufficiency (Adult)  Goal: Identify Related Risk Factors and Signs and Symptoms  Outcome: Ongoing (interventions implemented as appropriate)    03/16/17 0835   Respiratory Insufficiency   Related Risk Factors (Respiratory Insufficiency) activity intolerance;bedrest;obesity;pain;other (see comments)   Signs and Symptoms (Respiratory Insufficiency) cough (productive/ineffective);shortness of breath       Goal: Acid/Base Balance  Outcome: Ongoing (interventions implemented as appropriate)    03/16/17 0835   Respiratory Insufficiency (Adult)   Acid/Base Balance making progress toward outcome       Goal: Effective Ventilation  Outcome: Ongoing (interventions implemented as appropriate)    03/16/17 0835   Respiratory Insufficiency (Adult)   Effective Ventilation making progress toward outcome         Problem: Skin Integrity Impairment, Risk/Actual (Adult)  Goal: Identify Related Risk Factors and Signs and Symptoms  Outcome: Ongoing (interventions implemented as appropriate)    03/16/17 0835   Skin Integrity Impairment, Risk/Actual   Skin Integrity Impairment, Risk/Actual: Related Risk Factors edema;immobility   Signs and Symptoms (Skin  Integrity Impairment) edema       Goal: Skin Integrity/Wound Healing  Outcome: Ongoing (interventions implemented as appropriate)    03/16/17 0835   Skin Integrity Impairment, Risk/Actual (Adult)   Skin Integrity/Wound Healing making progress toward outcome         Problem: Fall Risk (Adult)  Goal: Identify Related Risk Factors and Signs and Symptoms  Outcome: Ongoing (interventions implemented as appropriate)    03/16/17 0835   Fall Risk   Fall Risk: Related Risk Factors gait/mobility problems       Goal: Absence of Falls  Outcome: Ongoing (interventions implemented as appropriate)    03/16/17 0835   Fall Risk (Adult)   Absence of Falls making progress toward outcome

## 2017-03-16 NOTE — PROGRESS NOTES
Acute Care - Speech Language Pathology   Swallow Initial Evaluation [facility]     Patient Name: Lucy Sevilla  : 1960  MRN: [id number]  Today's Date: 3/16/2017  Onset of Illness/Injury or Date of Surgery Date: 17            Admit Date: 3/16/2017    SPEECH-LANGUAGE PATHOLOGY EVALUATION - SWALLOW  Subjective: The patient was seen on this date for a Clinical Swallow evaluation.  Patient was alert and cooperative.    The patient's history is significant for trach.  Admitted w/ resp failure & possible pna.   Objective: Textures given included thin liquid, puree consistency, mechanical soft consistency and regular consistency.  Assessment: Trialed w/ PMV on & off. Question wet voicing w/ thin liquids by straw.  Very difficult to determine & r/o aspiration.  Would suggest a VFSS to better assess swallow function.   SLP Findings:  R/o oropharyngeal dysphagia  Recommendations: Diet Textures: thin liquid, regular consistency food.  Medications should be taken whole with thin liquids.   Recommended Strategies: Upright for PO and small bites and sips. Oral care before breakfast, after all meals and PRN.  Other Recommended Evaluations: VFSS      Visit Dx:     ICD-10-CM ICD-9-CM   1. Acute on chronic respiratory failure with hypoxia J96.21 518.84     799.02   2. HAP (hospital-acquired pneumonia) J18.9 486   3. Generalized weakness R53.1 780.79     Patient Active Problem List   Diagnosis   • Acute on chronic respiratory failure with hypoxia   • Tracheostomy in place   • Hepatitis C   • Diabetes mellitus   • Hyperlipidemia   • Sleep apnea, obstructive   • Hypertension   • Atrial fibrillation   • CHF (congestive heart failure)   • Renal disorder   • Pneumonia     Past Medical History   Diagnosis Date   • Anemia    • Atrial fibrillation    • CHF (congestive heart failure)    • Diabetes mellitus    • Hepatitis C    • Hyperlipidemia    • Hypertension    • Renal disorder    • Sleep apnea, obstructive    • Tracheostomy  in place      Past Surgical History   Procedure Laterality Date   • Tracheostomy     • Gtube replacement            SWALLOW EVALUATION (last 72 hours)      Swallow Evaluation       03/16/17 1153                Rehab Evaluation    Document Type evaluation  -NB        Symptoms Noted During/After Treatment none  -NB        General Information    Patient Profile Review yes  -NB        Current Diet Limitations thin liquids;regular solid  -NB        Precautions/Limitations, Vision WFL  -NB        Precautions/Limitations, Hearing WFL  -NB        Prior Level of Function- Communication functional in all spheres;other (comment)   w/ PMV  -NB        Prior Level of Function- Swallowing no diet consistency restrictions  -NB        Plans/Goals Discussed With patient  -NB        Barriers to Rehab medically complex  -NB        Clinical Impression    Patient's Goals For Discharge patient did not state  -NB        SLP Swallowing Diagnosis other (see comments)   r/o oropharyngeal dysphagia  -NB        Rehab Potential/Prognosis, Swallowing good, to achieve stated therapy goals  -NB        Criteria for Skilled Therapeutic Interventions Met skilled criteria for dysphagia intervention met  -NB        Therapy Frequency PRN  -NB        Predicted Duration Therapy Interv (days) until discharge  -NB        Expected Duration Therapy Session (min) 15-30 minutes  -NB        SLP Diet Recommendation regular textures;thin liquids  -NB        Recommended Diagnostics VFSS ([facility])  -NB        Recommended Feeding/Eating Techniques small sips/bites  -NB        SLP Rec. for Method of Medication Administration meds whole with thin liquid  -NB        Monitor For Signs Of Aspiration right lower lobe infiltrates  -NB        Anticipated Discharge Disposition skilled nursing facility  -NB        Pain Assessment    Pain Assessment No/denies pain  -NB        Cognitive Assessment/Intervention    Current Cognitive/Communication Assessment functional   for eval  -NB         Orientation Status oriented x 4  -NB        Follows Commands/Answers Questions 100% of the time  -NB        Oral Motor Structure and Function    Oral Motor Anatomy and Physiology patient demonstrates anatomy that is WNL  -NB        Dentition Assessment present and adequate  -NB        Secretion Management requires suctioning to control secretions;other (see comments)   via trach  -NB        Mucosal Quality moist, healthy  -NB        Velar Elevation WNL (within normal limits)  -NB        Volitional Swallow no difficulties initiating volitional swallow  -NB        Volitional Cough no difficulties initiating volitional cough  -NB        Oral Musculature General Assessment WNL (within normal limits)  -NB          User Key  (r) = Recorded By, (t) = Taken By, (c) = Cosigned By    Initials Name Effective Dates    NB Asia Espinosa, MS [facility]-SLP 04/13/15 -         EDUCATION  The patient has been educated in the following areas:   Dysphagia (Swallowing Impairment).    SLP Recommendation and Plan  SLP Swallowing Diagnosis: other (see comments) (r/o oropharyngeal dysphagia)  SLP Diet Recommendation: regular textures, thin liquids  Recommended Feeding/Eating Techniques: small sips/bites  SLP Rec. for Method of Medication Administration: meds whole with thin liquid  Monitor For Signs Of Aspiration: right lower lobe infiltrates  Recommended Diagnostics: VFSS ([facility])  Criteria for Skilled Therapeutic Interventions Met: skilled criteria for dysphagia intervention met  Anticipated Discharge Disposition: skilled nursing facility  Rehab Potential/Prognosis, Swallowing: good, to achieve stated therapy goals  Therapy Frequency: PRN             Plan of Care Review  Plan Of Care Reviewed With: patient  Progress: progress toward functional goals as expected  Outcome Summary/Follow up Plan: BSE: would suggest VFSS to better r/o aspiration.  Continue w/ regular & thin          IP SLP Goals       03/16/17 1151          Safely Consume Diet     Safely Consume Diet- SLP, Date Established 03/16/17  -NB      Safely Consume Diet- SLP, Time to Achieve by discharge  -NB        User Key  (r) = Recorded By, (t) = Taken By, (c) = Cosigned By    Initials Name Provider Type    SE Espinosa MS CCC-SLP Speech and Language Pathologist             SLP Outcome Measures (last 72 hours)      SLP Outcome Measures       03/16/17 1151          SLP Outcome Measures    Outcome Measure Used? Adult NOMS  -NB      FCM Scores    FCM Chosen Swallowing  -NB      Swallowing FCM Score 7  -NB        User Key  (r) = Recorded By, (t) = Taken By, (c) = Cosigned By    Initials Name Effective Dates    SE Espinosa MS CCC-SLP 04/13/15 -            Time Calculation:         Time Calculation- SLP       03/16/17 1157          Time Calculation- SLP    SLP Received On 03/16/17  -NB        User Key  (r) = Recorded By, (t) = Taken By, (c) = Cosigned By    Initials Name Provider Type    SE Espinosa MS CCC-SLP Speech and Language Pathologist          Therapy Charges for Today     Code Description Service Date Service Provider Modifiers Qty    [id number]  ST EVAL ORAL PHARYNG SWALLOW 4 3/16/2017 Asia Espinosa MS CCC-SLP GN 1               MS BROOK Virk  3/16/2017

## 2017-03-16 NOTE — PLAN OF CARE
Problem: Patient Care Overview (Adult)  Goal: Plan of Care Review    03/16/17 1151   Coping/Psychosocial Response Interventions   Plan Of Care Reviewed With patient   Patient Care Overview   Progress progress toward functional goals as expected   Outcome Evaluation   Outcome Summary/Follow up Plan BSE: would suggest VFSS to better r/o aspiration. Continue w/ regular & thin         Problem: Inpatient SLP  Goal: Dysphagia- Patient will safely consume diet as per recommendation with no signs/symptoms of aspiration    03/16/17 1151   Safely Consume Diet   Safely Consume Diet- SLP, Date Established 03/16/17   Safely Consume Diet- SLP, Time to Achieve by discharge

## 2017-03-16 NOTE — ED PROVIDER NOTES
History   Pt presents to the ED c/o SOA that began a 2 days ago. Pt is currently at rehab s/p tracheostomy placement. Family reports a recent tracheostomy infection, and states that the pt has required multiple episodes of suctioning. Currently taking Levaquin.     Exam  Mild respiratory distress.   Rhonchi in bilateral bases.  Cardiovascular exam is within normal limits and without murmur    Course  Plan to admit pt for further treatment of LLL infiltrate.    Attestation:  I supervised care provided by the midlevel provider.    We have discussed this patient's history, physical exam, and treatment plan.    I have reviewed the note and personally saw and examined the patient and agree with the plan of care.  I agree with the midlevel provider's findings and plan. I reviewed the midlevel providers note.     Documentation assistance provided by justin Shaw for Dr Emmanuel Information recorded by the justin was done at my direction and has been verified and validated by me.     Marisol Shaw  03/16/17 2416       Harjit Emmanuel MD  03/16/17 5112

## 2017-03-16 NOTE — PAYOR COMM NOTE
"Sunday Sevilla (56 y.o. Female)                                                           RQUEST FOR INPATIENT                                                      CONTACT=   ONEIL FERNÁNDEZ                                                     FAX   [phone]                                                   #   [phone]          Date of Birth Social Security Number Address Home Phone MRN    1960  [address]  [facility] [id number] [phone] [id number]    Mormonism Marital Status          None        Admission Date Admission Type Admitting Provider Attending Provider Department, Room/Bed    3/16/17 Emergency Cedric Lemon MD Ray, Jonathan, MD [address], 611/1    Discharge Date Discharge Disposition Discharge Destination                      Attending Provider: Tian Caicedo MD     Allergies:  Codeine    Isolation:  None   Infection:  None   Code Status:  FULL    Ht:  66\" (167.6 cm)   Wt:  322 lb (146 kg)    Admission Cmt:  None   Principal Problem:  Acute on chronic respiratory failure with hypoxia [J96.21]                 Active Insurance as of 3/16/2017     Primary Coverage     Payor Plan Insurance Group Employer/Plan Group    PASSPORT PASSPORT      Payor Plan Address Payor Plan Phone Number Effective From Effective To    [address] [phone] 1/1/1998     [place] [address]       Subscriber Name Subscriber Birth Date Member ID       SUNDAY SEVILLA 1960 [id number]                 Emergency Contacts      (Rel.) Home Phone Work Phone Mobile Phone    Jonathan Das (Power of ) [phone] -- [phone]            Insurance Information                PASSPORT/PASSPORT Phone: [phone]    Subscriber: Sunday Sevilla Subscriber#: [id number]    Group#:  Precert#:              History & Physical      Cedric Lemon MD at 3/16/2017  5:21 AM          HISTORY AND PHYSICAL   [facility]        Patient " Identification:  Name: Lucy Sevilla  Age: 56 y.o.  Sex: female  :  1960  MRN: [id number]                     Primary Care Physician: Eloy Cancino MD    Chief Complaint:  Short of air and coughing    History of Present Illness:         The patient is a 56-year-old white female with history of sleep apnea, atrial fibrillation, CHF, diabetes mellitus, hepatitis C, hypertension, hyperlipidemia and renal disorder who usually goes to a lot of on hospital but now is been skilled nursing facility and was noted to be more short of air over the past few days with cough and lung congestion.  She was at the [facility] and was sent in for further treatment with worsening shortness of air.  She was on 15 L trach mask has had tracheostomy for quite some time.  She's also been on G-tube but has been able to eat regular type food.  She denies having any fevers or chills.  She says she's not had any swallowing problems.  She's been weak and is getting therapy but is not been able to walk.  The patient was evaluated in the ER and noted to have pneumonia on x-ray.  She's being admitted for treatment of healthcare acquired pneumonia and acute on chronic respiratory failure.  She's not had any nausea or vomiting.  She's been taking care of by Dr. Hall at [place] for her chronic pulmonary problems and sleep apnea.    Past Medical History:  Past Medical History   Diagnosis Date   • Anemia    • Atrial fibrillation    • CHF (congestive heart failure)    • Diabetes mellitus    • Hepatitis C    • Hyperlipidemia    • Hypertension    • Renal disorder    • Sleep apnea, obstructive    • Tracheostomy in place      Past Surgical History:  Past Surgical History   Procedure Laterality Date   • Tracheostomy     • Gtube replacement        Home Meds:  No current facility-administered medications on file prior to encounter.      No current outpatient prescriptions on file prior to encounter.     Prescriptions Prior to Admission    Medication Sig Dispense Refill Last Dose   • acetaminophen (TYLENOL) 325 MG tablet 650 mg by Per G Tube route Every 6 (Six) Hours As Needed for Mild Pain (1-3).      • albuterol (ACCUNEB) 0.63 MG/3ML nebulizer solution Take 1 ampule by nebulization Every 4 (Four) Hours As Needed for Wheezing.      • albuterol (ACCUNEB) 0.63 MG/3ML nebulizer solution Take 1 ampule by nebulization Every 6 (Six) Hours. 1 dose via trach every six hours related to chronic obstructive pulmonary disease      • arformoterol (BROVANA) 15 MCG/2ML nebulizer solution Take  by nebulization 2 (Two) Times a Day. One dose via trach every twelve hours      • aspirin 81 MG EC tablet 81 mg by Per G Tube route Daily.      • B Complex-C-Folic Acid (JOY-MARLENY PO) 1 tablet by Per G Tube route Daily.      • budesonide (PULMICORT) 0.5 MG/2ML nebulizer solution Take 0.5 mg by nebulization 2 (Two) Times a Day. 1 puff inhale orally two times a day      • diltiaZEM (CARDIZEM) 60 MG tablet 60 mg by Per G Tube route 3 (Three) Times a Day.      • enoxaparin (LOVENOX) 40 MG/0.4ML solution syringe Inject 40 mg under the skin Daily.      • famotidine (PEPCID) 20 MG tablet 20 mg by Per G Tube route 2 (Two) Times a Day.      • furosemide (LASIX) 20 MG tablet 20 mg by Per G Tube route Daily.      • haloperidol (HALDOL) 5 MG tablet 2.5 mg by Per G Tube route 2 (Two) Times a Day As Needed for Agitation. Give 1/2 tablet every twelve hours as needed for agitation      • insulin detemir (LEVEMIR) 100 UNIT/ML injection Inject 20 Units under the skin 2 (Two) Times a Day.      • insulin NPH-insulin regular (NOVOLIN 70/30) (70-30) 100 UNIT/ML injection Inject  under the skin 2 (Two) Times a Day With Meals. Sliding scale      • ipratropium-albuterol (COMBIVENT)  MCG/ACT inhaler Inhale 1 puff Every 6 (Six) Hours As Needed for Wheezing. 1 dose via trach every six hours as needed for SOA      • Lanthanum Carbonate 750 MG pack 750 mg by Per G Tube route Every 8 (Eight) Hours.       • levoFLOXacin (LEVAQUIN) 750 MG tablet 750 mg by Per G Tube route Daily.      • metoprolol tartrate (LOPRESSOR) 25 MG tablet 25 mg by Per G Tube route 2 (Two) Times a Day.      • ondansetron (ZOFRAN) 4 MG tablet 4 mg by Per G Tube route Every 4 (Four) Hours As Needed for Nausea or Vomiting.      • polyethylene glycol (MIRALAX) packet 17 g by Per G Tube route Daily.      • traMADol (ULTRAM) 50 MG tablet 50 mg by Per G Tube route Every 6 (Six) Hours As Needed for Moderate Pain (4-6).      • tuberculin (TUBERSOL) 5 UNIT/0.1ML injection Inject 5 Units into the skin Every Night.          Allergies:  Allergies   Allergen Reactions   • Codeine      Immunizations:    There is no immunization history on file for this patient.  Social History:   Social History     Social History Narrative   • No narrative on file     Social History     Social History   • Marital status:      Spouse name: N/A   • Number of children: N/A   • Years of education: N/A     Occupational History   • Not on file.     Social History Main Topics   • Smoking status: Former Smoker   • Smokeless tobacco: Not on file   • Alcohol use No   • Drug use: No   • Sexual activity: Not on file     Other Topics Concern   • Not on file     Social History Narrative   • No narrative on file       Family History:  Family History   Problem Relation Age of Onset   • COPD Mother    • Heart disease Mother         Review of Systems  See history of present illness and past medical history.  Patient denies headache, dizziness, syncope, falls, trauma, change in vision, change in hearing, change in taste, changes in weight, changes in appetite, focal weakness, numbness, or paresthesia.  Patient denies chest pain, palpitations,  orthopnea, PND, cough, sinus pressure, rhinorrhea, epistaxis, hemoptysis, nausea, vomiting,hematemesis, diarrhea, constipation or hematchezia.  Denies cold or heat intolerance, polydipsia, polyuria, polyphagia. Denies hematuria, pyuria,  "dysuria, hesitancy, frequency or urgency.   Denies fever, chills, sweats, night sweats.   Remainder of ROS is negative.    Objective:  tMax 24 hrs: Temp (24hrs), Av.9 °F (36.6 °C), Min:97.9 °F (36.6 °C), Max:97.9 °F (36.6 °C)    Vitals Ranges:   Temp:  [97.9 °F (36.6 °C)] 97.9 °F (36.6 °C)  Heart Rate:  [] 95  Resp:  [18-24] 18  BP: (122-138)/(60-94) 126/74      Exam:  Visit Vitals   • /74 (Patient Position: Sitting)   • Pulse 95   • Temp 97.9 °F (36.6 °C)   • Resp 18   • Ht 66\" (167.6 cm)   • Wt (!) 374 lb (170 kg)   • SpO2 98%   • BMI 60.37 kg/m2       General Appearance:    Alert, cooperative, no distress, appears stated age, obese    Head:    Normocephalic, without obvious abnormality, atraumatic   Eyes:    PERRL, conjunctiva/corneas clear, EOM's intact, both eyes   Ears:    Normal external ear canals, both ears   Nose:   Nares normal, septum midline, mucosa normal, no drainage    or sinus tenderness   Throat:   Lips, mucosa, and tongue normal   Neck:   Supple, symmetrical, trachea midline, no adenopathy; tracheostomy in place     thyroid:  no enlargement/tenderness/nodules; no carotid    bruit or JVD   Back:     Symmetric, no curvature, ROM normal, no CVA tenderness   Lungs:     diffuse rales and rhonchi bilaterally, respirations unlabored   Chest Wall:    No tenderness or deformity    Heart:    Regular rate and rhythm, S1 and S2 normal, no murmur, rub   or gallop   Abdomen:     Soft, non-tender, bowel sounds active all four quadrants,     no masses, no hepatomegaly, no splenomegaly, PEG tube in place    Extremities:   Extremities normal, atraumatic, no cyanosis or edema   Pulses:   2+ and symmetric all extremities   Skin:   Skin color, texture, turgor normal, no rashes or lesions   Lymph nodes:   Cervical, supraclavicular, and axillary nodes normal   Neurologic:   CNII-XII intact, normal strength, sensation intact throughout      .    Data Review:  Lab Results (last 72 hours)     Procedure " Component Value Units Date/Time    CBC & Differential [[id number]] Collected:  03/16/17 0232    Specimen:  Blood Updated:  03/16/17 0300    Narrative:       The following orders were created for panel order CBC & Differential.  Procedure                               Abnormality         Status                     ---------                               -----------         ------                     CBC Auto Differential[[id number]]         Abnormal            Final result                 Please view results for these tests on the individual orders.    CBC Auto Differential [[id number]]  (Abnormal) Collected:  03/16/17 0232    Specimen:  Blood Updated:  03/16/17 0300     WBC 8.35 10*3/mm3      RBC 4.18 10*6/mm3      Hemoglobin 11.7 (L) g/dL      Hematocrit 37.2 %      MCV 89.0 fL      MCH 28.0 pg      MCHC 31.5 (L) g/dL      RDW 17.1 (H) %      RDW-SD 55.5 (H) fl      MPV 9.7 fL      Platelets 266 10*3/mm3      Neutrophil % 69.3 %      Lymphocyte % 20.8 %      Monocyte % 6.0 %      Eosinophil % 2.9 %      Basophil % 0.6 %      Immature Grans % 0.4 %      Neutrophils, Absolute 5.79 10*3/mm3      Lymphocytes, Absolute 1.74 10*3/mm3      Monocytes, Absolute 0.50 10*3/mm3      Eosinophils, Absolute 0.24 10*3/mm3      Basophils, Absolute 0.05 10*3/mm3      Immature Grans, Absolute 0.03 10*3/mm3     Comprehensive Metabolic Panel [[id number]]  (Abnormal) Collected:  03/16/17 0232    Specimen:  Blood Updated:  03/16/17 0304     Glucose 150 (H) mg/dL      BUN 49 (H) mg/dL      Creatinine 2.39 (H) mg/dL      Sodium 139 mmol/L      Potassium 4.5 mmol/L      Chloride 98 mmol/L      CO2 28.0 mmol/L      Calcium 9.6 mg/dL      Total Protein 7.5 g/dL      Albumin 3.80 g/dL      ALT (SGPT) 15 U/L      AST (SGOT) 14 U/L      Alkaline Phosphatase 93 U/L      Total Bilirubin 0.2 mg/dL      eGFR Non African Amer 21 (L) mL/min/1.73      Globulin 3.7 gm/dL      A/G Ratio 1.0 g/dL      BUN/Creatinine Ratio 20.5      Anion Gap 13.0 mmol/L      Troponin [[id number]]  (Normal) Collected:  03/16/17 0232    Specimen:  Blood Updated:  03/16/17 0304     Troponin T <0.010 ng/mL     Narrative:       Troponin T Reference Ranges:  Less than 0.03 ng/mL:    Negative for AMI  0.03 to 0.09 ng/mL:      Indeterminant for AMI  Greater than 0.09 ng/mL: Positive for AMI    Blood Culture [[id number]] Collected:  03/16/17 0348    Specimen:  Blood from Arm, Left Updated:  03/16/17 0354    BNP [[id number]]  (Normal) Collected:  03/16/17 0232    Specimen:  Blood Updated:  03/16/17 0408     proBNP 468.9 pg/mL     Narrative:       Among patients with dyspnea, NT-proBNP is highly sensitive for the detection of acute congestive heart failure. In addition NT-proBNP of <300 pg/ml effectively rules out acute congestive heart failure with 99% negative predictive value.    Lactic Acid, Plasma [[id number]]  (Normal) Collected:  03/16/17 0348    Specimen:  Blood from Arm, Left Updated:  03/16/17 0412     Lactate 1.4 mmol/L     Blood Culture [[id number]] Collected:  03/16/17 0419    Specimen:  Blood from Arm, Right Updated:  03/16/17 0421                Results from last 7 days  Lab Units 03/16/17  0232   HEMOGLOBIN A1C % 4.90      Imaging Results (all)     Procedure Component Value Units Date/Time    XR Chest 1 View [[id number]]      Updated:  03/16/17 0247        Patient Active Problem List   Diagnosis Code   • Acute on chronic respiratory failure with hypoxia J96.21   • Tracheostomy in place Z93.0   • Hepatitis C B19.20   • Diabetes mellitus E11.9   • Hyperlipidemia E78.5   • Sleep apnea, obstructive G47.33   • Hypertension I10   • Atrial fibrillation I48.91   • CHF (congestive heart failure) I50.9   • Renal disorder N28.9   • Pneumonia J18.9       Assessment:  Principal Problem:    Acute on chronic respiratory failure with hypoxia  Active Problems:    Tracheostomy in place    Hepatitis C    Diabetes mellitus    Hyperlipidemia    Sleep apnea, obstructive    Hypertension    Atrial fibrillation    CHF  "(congestive heart failure)    Renal disorder    Pneumonia      Plan:  The patient's admitted to hospital and cultures of been obtained.  Patient will be started on broad-spectrum antibiotics for healthcare acquired pneumonia.  We'll continue with oxygen bronchodilators and consult pulmonary medicine.    Cedric Lemon MD  3/16/2017  6:48 AM       Electronically signed by Cedric Lemon MD at 3/16/2017  6:50 AM           Emergency Department Notes      Tania Best RN at 3/16/2017  1:58 AM          SOA x 1 hour prior to EMS arrival. Pt has trach     Tania Best RN  03/16/17 0159       Electronically signed by Tania Best RN at 3/16/2017  1:59 AM      CAROLE Fiore at 3/16/2017  2:57 AM           EMERGENCY DEPARTMENT ENCOUNTER    CHIEF COMPLAINT  Chief Complaint: Dyspnea  History given by: Patient, Family  History limited by:  Vague historian  Room Number: 01/01  PMD: Eloy Cancino MD      HPI:  Pt is a 56 y.o. female who presents complaining of dyspnea that onset a few a days ago. Pt is currently staying in rehab facility after being admitted to [place] and having a tracheostomy placed. Pt was last seen by family at 2100 last night and reported having some dyspnea. She had trache suction performed at that time. Pt has been taking Levaquin for recent trache infection.    Duration:  2 days  Onset: gradual  Timing: constant  Quality: \"short of breath\"  Intensity/Severity: moderate  Progression: unchanged  Associated Symptoms: none  Aggravating Factors: none  Alleviating Factors: none  Previous Episodes: hx of CHF  Treatment before arrival: has been taking Levaquin for trache infection    PAST MEDICAL HISTORY  Active Ambulatory Problems     Diagnosis Date Noted   • No Active Ambulatory Problems     Resolved Ambulatory Problems     Diagnosis Date Noted   • No Resolved Ambulatory Problems     Past Medical History   Diagnosis Date   • Anemia    • Atrial fibrillation    • CHF (congestive heart failure)    • " Diabetes mellitus    • Hepatitis C    • Hyperlipidemia    • Hypertension    • Renal disorder    • Sleep apnea, obstructive    • Tracheostomy in place        PAST SURGICAL HISTORY  Past Surgical History   Procedure Laterality Date   • Tracheostomy     • Gtube replacement         FAMILY HISTORY  History reviewed. No pertinent family history.    SOCIAL HISTORY  Social History     Social History   • Marital status:      Spouse name: N/A   • Number of children: N/A   • Years of education: N/A     Occupational History   • Not on file.     Social History Main Topics   • Smoking status: Not on file   • Smokeless tobacco: Not on file   • Alcohol use Not on file   • Drug use: Not on file   • Sexual activity: Not on file     Other Topics Concern   • Not on file     Social History Narrative   • No narrative on file       ALLERGIES  Codeine    REVIEW OF SYSTEMS  Review of Systems   Constitutional: Negative for fever.   HENT: Negative for sore throat.    Eyes: Negative.    Respiratory: Positive for shortness of breath. Negative for cough.    Cardiovascular: Negative for chest pain.   Gastrointestinal: Negative for abdominal pain, diarrhea and vomiting.   Genitourinary: Negative for dysuria.   Musculoskeletal: Negative for neck pain.   Skin: Negative for rash.   Allergic/Immunologic: Negative.    Neurological: Negative for weakness, numbness and headaches.   Hematological: Negative.    Psychiatric/Behavioral: Negative.    All other systems reviewed and are negative.      PHYSICAL EXAM  ED Triage Vitals   Temp Heart Rate Resp BP SpO2   03/16/17 0159 03/16/17 0159 03/16/17 0159 03/16/17 0159 03/16/17 0159   97.9 °F (36.6 °C) 72 24 138/68 94 %      Temp src Heart Rate Source Patient Position BP Location FiO2 (%)   -- -- -- -- --              Physical Exam   Constitutional: She is oriented to person, place, and time and well-developed, well-nourished, and in no distress. No distress.   HENT:   Head: Normocephalic and  atraumatic.   Eyes: EOM are normal. Pupils are equal, round, and reactive to light.   Neck: Normal range of motion. Neck supple.   Cardiovascular: Normal rate, regular rhythm and normal heart sounds.    Pulmonary/Chest: She is in respiratory distress (moderate). She has rhonchi ( moderate) in the right upper field, the right middle field, the right lower field, the left upper field, the left middle field and the left lower field.   Abdominal: Soft. There is no tenderness. There is no rebound and no guarding.   Musculoskeletal: Normal range of motion. She exhibits edema ( 1+ BLE).   Neurological: She is alert and oriented to person, place, and time. She has normal sensation and normal strength.   Skin: Skin is warm and dry. No rash noted.   Psychiatric: Mood and affect normal.   Nursing note and vitals reviewed.      LAB RESULTS  Lab Results (last 24 hours)     Procedure Component Value Units Date/Time    CBC & Differential [[id number]] Collected:  03/16/17 0232    Specimen:  Blood Updated:  03/16/17 0300    Narrative:       The following orders were created for panel order CBC & Differential.  Procedure                               Abnormality         Status                     ---------                               -----------         ------                     CBC Auto Differential[[id number]]         Abnormal            Final result                 Please view results for these tests on the individual orders.    Comprehensive Metabolic Panel [[id number]]  (Abnormal) Collected:  03/16/17 0232    Specimen:  Blood Updated:  03/16/17 0304     Glucose 150 (H) mg/dL      BUN 49 (H) mg/dL      Creatinine 2.39 (H) mg/dL      Sodium 139 mmol/L      Potassium 4.5 mmol/L      Chloride 98 mmol/L      CO2 28.0 mmol/L      Calcium 9.6 mg/dL      Total Protein 7.5 g/dL      Albumin 3.80 g/dL      ALT (SGPT) 15 U/L      AST (SGOT) 14 U/L      Alkaline Phosphatase 93 U/L      Total Bilirubin 0.2 mg/dL      eGFR Non African Amer 21  (L) mL/min/1.73      Globulin 3.7 gm/dL      A/G Ratio 1.0 g/dL      BUN/Creatinine Ratio 20.5      Anion Gap 13.0 mmol/L     Troponin [[id number]]  (Normal) Collected:  03/16/17 0232    Specimen:  Blood Updated:  03/16/17 0304     Troponin T <0.010 ng/mL     Narrative:       Troponin T Reference Ranges:  Less than 0.03 ng/mL:    Negative for AMI  0.03 to 0.09 ng/mL:      Indeterminant for AMI  Greater than 0.09 ng/mL: Positive for AMI    CBC Auto Differential [[id number]]  (Abnormal) Collected:  03/16/17 0232    Specimen:  Blood Updated:  03/16/17 0300     WBC 8.35 10*3/mm3      RBC 4.18 10*6/mm3      Hemoglobin 11.7 (L) g/dL      Hematocrit 37.2 %      MCV 89.0 fL      MCH 28.0 pg      MCHC 31.5 (L) g/dL      RDW 17.1 (H) %      RDW-SD 55.5 (H) fl      MPV 9.7 fL      Platelets 266 10*3/mm3      Neutrophil % 69.3 %      Lymphocyte % 20.8 %      Monocyte % 6.0 %      Eosinophil % 2.9 %      Basophil % 0.6 %      Immature Grans % 0.4 %      Neutrophils, Absolute 5.79 10*3/mm3      Lymphocytes, Absolute 1.74 10*3/mm3      Monocytes, Absolute 0.50 10*3/mm3      Eosinophils, Absolute 0.24 10*3/mm3      Basophils, Absolute 0.05 10*3/mm3      Immature Grans, Absolute 0.03 10*3/mm3     BNP [[id number]]  (Normal) Collected:  03/16/17 0232    Specimen:  Blood Updated:  03/16/17 0408     proBNP 468.9 pg/mL     Narrative:       Among patients with dyspnea, NT-proBNP is highly sensitive for the detection of acute congestive heart failure. In addition NT-proBNP of <300 pg/ml effectively rules out acute congestive heart failure with 99% negative predictive value.    Blood Culture [[id number]] Collected:  03/16/17 0348    Specimen:  Blood from Arm, Left Updated:  03/16/17 0354    Lactic Acid, Plasma [[id number]]  (Normal) Collected:  03/16/17 0348    Specimen:  Blood from Arm, Left Updated:  03/16/17 0412     Lactate 1.4 mmol/L           I ordered the above labs and reviewed the results    RADIOLOGY  XR Chest 1 View - Developing L lower  lobe pneumonia        I ordered the above noted radiological studies. Interpreted by radiologist. Reviewed by me in PACS.       PROCEDURES  Procedures  EKG         EKG time: 0302  Rhythm/Rate: NSR, 90BPM  P waves and IN: nml  QRS, axis: nml   ST and T waves: nml     Interpreted Contemporaneously by me, independently viewed  No prior EKG for comparison       PROGRESS AND CONSULTS  ED Course   Time 3:59 AM  Discussed case with Dr Lemon (Hospitalist)  Reviewed history, exam, results and treatments.  Discussed concerns and plan of care. Dr Lemon accepts pt to be admitted to telemetry.  4:07 AM  Dr. Emmanuel has seen and evaluated pt and agrees with tx plan.         MEDICAL DECISION MAKING    MDM  Number of Diagnoses or Management Options  Acute on chronic respiratory failure with hypoxia:   HAP (hospital-acquired pneumonia):      Amount and/or Complexity of Data Reviewed  Clinical lab tests: ordered and reviewed  Tests in the radiology section of CPT®:  ordered and reviewed (CXR: Developing L lower lobe pneumonia)  Tests in the medicine section of CPT®:  reviewed and ordered (EKG - See EKG procedure note  )  Discuss the patient with other providers: yes (Dr. Lemon)           DIAGNOSIS  Final diagnoses:   Acute on chronic respiratory failure with hypoxia   HAP (hospital-acquired pneumonia)       DISPOSITION  ADMISSION    Discussed treatment plan and reason for admission with pt/family and admitting physician.  Pt/family voiced understanding of the plan for admission for further testing/treatment as needed.           Latest Documented Vital Signs:  As of 4:15 AM  BP- 126/73 HR- 90 Temp- 97.9 °F (36.6 °C) O2 sat- 96%    --  Documentation assistance provided by justin Hay for Fransisco Herbert PA-C.  Information recorded by the scribrigo was done at my direction and has been verified and validated by me.       Christopher Hay  03/16/17 0415       CAROLE Fiore  03/16/17 0419       Electronically signed by CAROLE Fiore  at 3/16/2017  4:19 AM        Vital Signs (last 24 hours)       03/15 0700  -  03/16 0659 03/16 0700  -  03/16 1009   Most Recent    Temp (°F) 97.9 -  98.3      98     98 (36.7)    Heart Rate 72 -  105       92    Resp 18 -  24      18     18    /94 -  138/68      118/59     118/59    SpO2 (%) 91 -  100       100          Hospital Medications (active)       Dose Frequency Start End    acetaminophen (TYLENOL) tablet 650 mg 650 mg Every 6 Hours PRN 3/16/2017     Sig - Route: 2 tablets by Per G Tube route Every 6 (Six) Hours As Needed for Mild Pain (1-3). - Per G Tube    arformoterol (BROVANA) nebulizer solution 15 mcg 15 mcg 2 Times Daily - RT 3/16/2017     Sig - Route: Take 2 mL by nebulization 2 (Two) Times a Day. - Nebulization    aspirin EC tablet 81 mg 81 mg Daily 3/16/2017     Sig - Route: Take 1 tablet by mouth Daily. - Oral    budesonide (PULMICORT) nebulizer solution 0.5 mg 0.5 mg 2 Times Daily - RT 3/16/2017     Sig - Route: Take 2 mL by nebulization 2 (Two) Times a Day. - Nebulization    dextrose (D50W) solution 25 g 25 g Every 15 Minutes PRN 3/16/2017     Sig - Route: Infuse 50 mL into a venous catheter Every 15 (Fifteen) Minutes As Needed for Low Blood Sugar (Blood Sugar Less Than 70, Patient Has IV Access - Unresponsive, NPO or Unable To Safely Swallow). - Intravenous    dextrose (GLUTOSE) oral gel 15 g 15 g Every 15 Minutes PRN 3/16/2017     Sig - Route: Take 15 g by mouth Every 15 (Fifteen) Minutes As Needed for Low Blood Sugar (Blood Sugar Less Than 70, Patient Alert, Is Not NPO & Can Safely Swallow). - Oral    diltiaZEM (CARDIZEM) tablet 60 mg 60 mg 3 Times Daily 3/16/2017     Sig - Route: 1 tablet by Per G Tube route 3 (Three) Times a Day. - Per G Tube    enoxaparin (LOVENOX) syringe 30 mg 30 mg Daily 3/16/2017     Sig - Route: Inject 0.3 mL under the skin Daily. - Subcutaneous    famotidine (PEPCID) tablet 20 mg 20 mg 2 Times Daily 3/16/2017     Sig - Route: 1 tablet by Per G Tube route 2  (Two) Times a Day. - Per G Tube    glucagon (human recombinant) (GLUCAGEN DIAGNOSTIC) injection 1 mg 1 mg Every 15 Minutes PRN 3/16/2017     Sig - Route: Inject 1 mg under the skin Every 15 (Fifteen) Minutes As Needed (Blood Glucose Less Than 70 - Patient Without IV Access - Unresponsive, NPO or Unable To Safely Swallow). - Subcutaneous    guaiFENesin (MUCINEX) 12 hr tablet 600 mg 600 mg 2 Times Daily 3/16/2017     Sig - Route: Take 1 tablet by mouth 2 (Two) Times a Day. - Oral    haloperidol (HALDOL) tablet 2.5 mg 2.5 mg 2 Times Daily PRN 3/16/2017     Sig - Route: 2.5 mg by Per G Tube route 2 (Two) Times a Day As Needed for Agitation. - Per G Tube    insulin aspart (novoLOG) injection 0-7 Units 0-7 Units 4 Times Daily Before Meals & Nightly 3/16/2017     Sig - Route: Inject 0-7 Units under the skin 4 (Four) Times a Day Before Meals & at Bedtime. - Subcutaneous    insulin detemir (LEVEMIR) injection 20 Units 20 Units 2 Times Daily 3/16/2017     Sig - Route: Inject 20 Units under the skin 2 (Two) Times a Day. - Subcutaneous    ipratropium-albuterol (DUO-NEB) nebulizer solution 3 mL 3 mL 4 Times Daily - RT 3/16/2017     Sig - Route: Take 3 mL by nebulization 4 (Four) Times a Day. - Nebulization    lanthanum (FOSRENOL) chewable tablet 500 mg 500 mg 3 Times Daily With Meals 3/16/2017     Sig - Route: Chew 1 tablet 3 (Three) Times a Day With Meals. - Oral    metoprolol tartrate (LOPRESSOR) tablet 25 mg 25 mg 2 Times Daily 3/16/2017     Sig - Route: 1 tablet by Per G Tube route 2 (Two) Times a Day. - Per G Tube    ondansetron (ZOFRAN) tablet 4 mg 4 mg Every 4 Hours PRN 3/16/2017     Sig - Route: 1 tablet by Per G Tube route Every 4 (Four) Hours As Needed for Nausea or Vomiting. - Per G Tube    Pharmacy to dose vancomycin  Continuous PRN 3/16/2017     Sig - Route: Continuous As Needed for Consult. - Does not apply    piperacillin-tazobactam (ZOSYN) 3.375 g in dextrose 50 mL IVPB (premix) 3.375 g Every 8 Hours 3/16/2017   "   Sig - Route: Infuse 50 mL into a venous catheter Every 8 (Eight) Hours. - Intravenous    piperacillin-tazobactam (ZOSYN) 4.5 g in dextrose 100 mL IVPB (premix) 4.5 g Once 3/16/2017 3/16/2017    Sig - Route: Infuse 100 mL into a venous catheter 1 (One) Time. - Intravenous    Cosign for Ordering: Accepted by Harjit Emmanuel MD on 3/16/2017  5:22 AM    polyethylene glycol (MIRALAX) powder 17 g 17 g Daily 3/16/2017     Sig - Route: 17 g by Per G Tube route Daily. - Per G Tube    sodium chloride 0.9 % flush 1-10 mL 1-10 mL As Needed 3/16/2017     Sig - Route: Infuse 1-10 mL into a venous catheter As Needed for Line Care. - Intravenous    sodium chloride 0.9 % flush 10 mL 10 mL As Needed 3/16/2017     Sig - Route: Infuse 10 mL into a venous catheter As Needed for Line Care. - Intravenous    Cosign for Ordering: Accepted by Harjit Emmanuel MD on 3/16/2017  3:02 AM    Linked Group 1:  \"And\" Linked Group Details        traMADol (ULTRAM) tablet 50 mg 50 mg Every 6 Hours PRN 3/16/2017     Sig - Route: 1 tablet by Per G Tube route Every 6 (Six) Hours As Needed for Moderate Pain (4-6). - Per G Tube    vancomycin 2250 mg/500 mL 0.9% NS IVPB ([facility]) 15 mg/kg × 146 kg Every 24 Hours 3/17/2017     Sig - Route: Infuse 500 mL into a venous catheter Daily. - Intravenous    vancomycin 3000 mg/500 mL 0.9% NS IVPB ([facility]) 17.6 mg/kg × 170 kg Once 3/16/2017 3/16/2017    Sig - Route: Infuse 500 mL into a venous catheter 1 (One) Time. - Intravenous    Cosign for Ordering: Accepted by Harjit Emmanuel MD on 3/16/2017  5:22 AM            Lab Results (last 24 hours)     Procedure Component Value Units Date/Time    CBC & Differential [[id number]] Collected:  03/16/17 0232    Specimen:  Blood Updated:  03/16/17 0300    Narrative:       The following orders were created for panel order CBC & Differential.  Procedure                               Abnormality         Status                     ---------                               " -----------         ------                     CBC Auto Differential[[id number]]         Abnormal            Final result                 Please view results for these tests on the individual orders.    CBC Auto Differential [[id number]]  (Abnormal) Collected:  03/16/17 0232    Specimen:  Blood Updated:  03/16/17 0300     WBC 8.35 10*3/mm3      RBC 4.18 10*6/mm3      Hemoglobin 11.7 (L) g/dL      Hematocrit 37.2 %      MCV 89.0 fL      MCH 28.0 pg      MCHC 31.5 (L) g/dL      RDW 17.1 (H) %      RDW-SD 55.5 (H) fl      MPV 9.7 fL      Platelets 266 10*3/mm3      Neutrophil % 69.3 %      Lymphocyte % 20.8 %      Monocyte % 6.0 %      Eosinophil % 2.9 %      Basophil % 0.6 %      Immature Grans % 0.4 %      Neutrophils, Absolute 5.79 10*3/mm3      Lymphocytes, Absolute 1.74 10*3/mm3      Monocytes, Absolute 0.50 10*3/mm3      Eosinophils, Absolute 0.24 10*3/mm3      Basophils, Absolute 0.05 10*3/mm3      Immature Grans, Absolute 0.03 10*3/mm3     Comprehensive Metabolic Panel [[id number]]  (Abnormal) Collected:  03/16/17 0232    Specimen:  Blood Updated:  03/16/17 0304     Glucose 150 (H) mg/dL      BUN 49 (H) mg/dL      Creatinine 2.39 (H) mg/dL      Sodium 139 mmol/L      Potassium 4.5 mmol/L      Chloride 98 mmol/L      CO2 28.0 mmol/L      Calcium 9.6 mg/dL      Total Protein 7.5 g/dL      Albumin 3.80 g/dL      ALT (SGPT) 15 U/L      AST (SGOT) 14 U/L      Alkaline Phosphatase 93 U/L      Total Bilirubin 0.2 mg/dL      eGFR Non African Amer 21 (L) mL/min/1.73      Globulin 3.7 gm/dL      A/G Ratio 1.0 g/dL      BUN/Creatinine Ratio 20.5      Anion Gap 13.0 mmol/L     Troponin [[id number]]  (Normal) Collected:  03/16/17 0232    Specimen:  Blood Updated:  03/16/17 0304     Troponin T <0.010 ng/mL     Narrative:       Troponin T Reference Ranges:  Less than 0.03 ng/mL:    Negative for AMI  0.03 to 0.09 ng/mL:      Indeterminant for AMI  Greater than 0.09 ng/mL: Positive for AMI    Blood Culture [[id number]] Collected:   03/16/17 0348    Specimen:  Blood from Arm, Left Updated:  03/16/17 0354    BNP [[id number]]  (Normal) Collected:  03/16/17 0232    Specimen:  Blood Updated:  03/16/17 0408     proBNP 468.9 pg/mL     Narrative:       Among patients with dyspnea, NT-proBNP is highly sensitive for the detection of acute congestive heart failure. In addition NT-proBNP of <300 pg/ml effectively rules out acute congestive heart failure with 99% negative predictive value.    Lactic Acid, Plasma [[id number]]  (Normal) Collected:  03/16/17 0348    Specimen:  Blood from Arm, Left Updated:  03/16/17 0412     Lactate 1.4 mmol/L     Blood Culture [[id number]] Collected:  03/16/17 0419    Specimen:  Blood from Arm, Right Updated:  03/16/17 0421    Hemoglobin A1c [[id number]]  (Normal) Collected:  03/16/17 0232    Specimen:  Blood Updated:  03/16/17 0604     Hemoglobin A1C 4.90 %     Narrative:       Hemoglobin A1C Ranges:    Increased Risk for Diabetes  5.7% to 6.4%  Diabetes                     >= 6.5%  Diabetic Goal                < 7.0%    CBC & Differential [[id number]] Collected:  03/16/17 0644    Specimen:  Blood Updated:  03/16/17 0708    Narrative:       The following orders were created for panel order CBC & Differential.  Procedure                               Abnormality         Status                     ---------                               -----------         ------                     CBC Auto Differential[[id number]]         Abnormal            Final result                 Please view results for these tests on the individual orders.    CBC Auto Differential [[id number]]  (Abnormal) Collected:  03/16/17 0644    Specimen:  Blood Updated:  03/16/17 0708     WBC 7.47 10*3/mm3      RBC 4.12 10*6/mm3      Hemoglobin 11.6 (L) g/dL      Hematocrit 36.6 %      MCV 88.8 fL      MCH 28.2 pg      MCHC 31.7 (L) g/dL      RDW 16.9 (H) %      RDW-SD 54.4 (H) fl      MPV 9.7 fL      Platelets 217 10*3/mm3      Neutrophil % 65.1 %      Lymphocyte % 23.8 %       Monocyte % 7.0 %      Eosinophil % 3.6 %      Basophil % 0.5 %      Immature Grans % 0.0 %      Neutrophils, Absolute 4.86 10*3/mm3      Lymphocytes, Absolute 1.78 10*3/mm3      Monocytes, Absolute 0.52 10*3/mm3      Eosinophils, Absolute 0.27 10*3/mm3      Basophils, Absolute 0.04 10*3/mm3      Immature Grans, Absolute 0.00 10*3/mm3     Basic Metabolic Panel [[id number]]  (Abnormal) Collected:  03/16/17 0644    Specimen:  Blood Updated:  03/16/17 0721     Glucose 131 (H) mg/dL      BUN 53 (H) mg/dL      Creatinine 2.28 (H) mg/dL      Sodium 140 mmol/L      Potassium 4.8 mmol/L      Chloride 98 mmol/L      CO2 28.1 mmol/L      Calcium 9.5 mg/dL      eGFR Non African Amer 22 (L) mL/min/1.73      BUN/Creatinine Ratio 23.2      Anion Gap 13.9 mmol/L     Narrative:       GFR Normal >60  Chronic Kidney Disease <60  Kidney Failure <15    TSH [[id number]]  (Normal) Collected:  03/16/17 0644    Specimen:  Blood Updated:  03/16/17 0729     TSH 2.480 mIU/mL     Urinalysis With / Culture If Indicated [[id number]]  (Abnormal) Collected:  03/16/17 0916    Specimen:  Urine from Urine, Clean Catch Updated:  03/16/17 0927     Color, UA Yellow      Appearance, UA Cloudy (A)      pH, UA <=5.0      Specific Gravity, UA 1.023      Glucose, UA Negative      Ketones, UA Negative      Bilirubin, UA Negative      Blood, UA Negative      Protein, UA Negative      Leuk Esterase, UA Negative      Nitrite, UA Negative      Urobilinogen, UA 0.2 E.U./dL     Narrative:       Urine microscopic not indicated.    Respiratory Panel, PCR [[id number]] Collected:  03/16/17 1000    Specimen:  Swab from Nasopharynx Updated:  03/16/17 1000

## 2017-03-16 NOTE — CONSULTS
Consult Note    Dr Karl [facility]ousa. [facility]  Pulmonary/Critical Care/Sleep Medicine    Reason for consult: Pneumonia, chronic hypoxic respiratory failure    HPI:   This is a 56-year-old female with a complicated medical history.  Normally she is cared by Dr. Hall at [facility] for her pulmonary problems and sleep apnea.  She has been in and out of the hospital many times and this is #4 trach.  Recently he did a bronchoscopy and told her that there is a skin overgrowth that needs to be lasered.  He was planning to send her to [facility] for laser procedure but she ended up in [facility].  While she was in [place] she had a CODE BLUE and was resuscitated.  The boyfriend reports that after the code patient developed this tremors.  She was sent to a rehabilitation and she has been in the rehabilitation for 4 weeks when she found to have increasing lung condition, cough requiring 15 L of oxygen by trach mask and sent to the emergency room.  The chest x-ray shows infiltrate consistent pneumonia and she has been started on Zosyn and vancomycin.  Patient unable to give me much history unfortunately.  She has a past medical history of sleep apnea, chronic hypoxic respiratory failure, atrial fibrillation, CHF, diabetes mellitus, hepatitis C, hypertension and hyperlipidemia.    Past Medical History   Diagnosis Date   • Anemia    • Atrial fibrillation    • CHF (congestive heart failure)    • Diabetes mellitus    • Hepatitis C    • Hyperlipidemia    • Hypertension    • Renal disorder    • Sleep apnea, obstructive    • Tracheostomy in place      Past Surgical History   Procedure Laterality Date   • Tracheostomy     • Gtube replacement       No current facility-administered medications on file prior to encounter.      No current outpatient prescriptions on file prior to encounter.     Allergies as of 03/16/2017 - [facility] as Reviewed 03/16/2017   Allergen Reaction Noted   • Codeine  03/16/2017     Social History  "  Substance Use Topics   • Smoking status: Former Smoker   • Smokeless tobacco: None   • Alcohol use No     Family History   Problem Relation Age of Onset   • COPD Mother    • Heart disease Mother        arformoterol 15 mcg Nebulization BID - RT   aspirin 81 mg Oral Daily   budesonide 0.5 mg Nebulization BID - RT   diltiaZEM 60 mg Per G Tube TID   enoxaparin 30 mg Subcutaneous Daily   famotidine 10 mg Per G Tube BID   guaiFENesin 600 mg Oral BID   insulin aspart 0-7 Units Subcutaneous 4x Daily AC & at Bedtime   insulin detemir 20 Units Subcutaneous BID   ipratropium-albuterol 3 mL Nebulization 4x Daily - RT   lanthanum 500 mg Oral TID With Meals   metoprolol tartrate 25 mg Per G Tube BID   piperacillin-tazobactam 3.375 g Intravenous Q8H   polyethylene glycol 17 g Per G Tube Daily   [START ON 3/17/2017] vancomycin 15 mg/kg Intravenous Q24H       Review of Systems   Unable to perform ROS: acuity of condition       Visit Vitals   • BP (!) 125/101 (BP Location: Right arm)   • Pulse 73   • Temp 98 °F (36.7 °C) (Oral)   • Resp 20   • Ht 66\" (167.6 cm)   • Wt (!) 322 lb (146 kg)   • SpO2 98%   • BMI 51.97 kg/m2     I/O last 3 completed shifts:  In: 100 [IV Piggyback:100]  Out: -   I/O this shift:  In: 600 [P.O.:600]  Out: -     Physical Exam   Constitutional: She appears well-developed and well-nourished.   Obese  Has a trach   HENT:   Head: Normocephalic and atraumatic.   Nose: No mucosal edema or nasal deformity.   Mouth/Throat: Mucous membranes are normal. No oral lesions. No lacerations. No oropharyngeal exudate.   Eyes: Conjunctivae are normal. Pupils are equal, round, and reactive to light. No scleral icterus. Right pupil is round and reactive. Left pupil is round and reactive. Pupils are equal.   Neck: Neck supple. No tracheal deviation present.   Cardiovascular: Regular rhythm, S1 normal and S2 normal.    No murmur heard.  Pulmonary/Chest: Effort normal. She has decreased breath sounds. She has wheezes. She has " rhonchi.   Abdominal: Soft. Normal appearance and bowel sounds are normal. There is no hepatosplenomegaly.   Musculoskeletal: Normal range of motion.   Neurological: She is alert. She has normal reflexes. She displays tremor (right upper arm). No cranial nerve deficit or sensory deficit.   Skin: Skin is warm, dry and intact. No cyanosis. Nails show no clubbing.   Psychiatric: She has a normal mood and affect. Her behavior is normal.         Results from last 7 days  Lab Units 03/16/17  0644   WBC 10*3/mm3 7.47   HEMOGLOBIN g/dL 11.6*   HEMATOCRIT % 36.6   PLATELETS 10*3/mm3 217       Results from last 7 days  Lab Units 03/16/17  1540 03/16/17  0644 03/16/17  0232   SODIUM mmol/L  --  140 139   POTASSIUM mmol/L 4.4 4.8 4.5   CHLORIDE mmol/L  --  98 98   TOTAL CO2 mmol/L  --  28.1 28.0   BUN mg/dL  --  53* 49*   CREATININE mg/dL  --  2.28* 2.39*   CALCIUM mg/dL  --  9.5 9.6   BILIRUBIN mg/dL  --   --  0.2   ALK PHOS U/L  --   --  93   ALT (SGPT) U/L  --   --  15   AST (SGOT) U/L  --   --  14   GLUCOSE mg/dL  --  131* 150*       CXR:   The patient is rotated to the right. The examination is  somewhat limited by the patient's marked obesity and rotation. There is  some slight haziness in the lower left chest which I suspect relates to  the overlying soft tissues but could potentially relate to an area of  developing pneumonia. The heart is prominent in size. A tracheostomy  tube is in place.    Assessment and plan:     · Acute on chronic hypoxic respiratory failure, patient is requiring more oxygen than normal.  Continue on weaning the oxygen.  · Healthcare associated pneumonia, I agree with antibiotics choice.  Patient is being in the hospital, nursing homes for most part of the year.  · Tremors, patient has tenderness in the boyfriend states that these started after the CODE BLUE at [facility].  I will ask neurology to see her and assist  · History of sleep apnea, she has tracheostomy and does not require any positive  pressures .  · Diabetes mellitus  · History of congestive heart failure  · Renal failure  · History of atrial fibrillation  · He states hepatitis C                                                           I spoke with the boyfriend Jonathan over the phone and gave him to update.        Karl Sousa MD,[facility]  Pulmonary/Critical Care and Sleep Medicine.  3/16/2017    EMR Dragon/Transcription disclaimer:   Much of this encounter note is an electronic transcription/translation of spoken language to printed text. The electronic translation of spoken language may permit erroneous, or at times, nonsensical words or phrases to be inadvertently transcribed; Although I have reviewed the note for such errors, some may still exist.

## 2017-03-16 NOTE — PROGRESS NOTES
Acute Care - Physical Therapy Initial Evaluation  [facility]     Patient Name: Lucy Sevilla  : 1960  MRN: [id number]  Today's Date: 3/16/2017   Onset of Illness/Injury or Date of Surgery Date: 17  Date of Referral to PT: 17  Referring Physician: Xochilt      Admit Date: 3/16/2017     Visit Dx:    ICD-10-CM ICD-9-CM   1. Acute on chronic respiratory failure with hypoxia J96.21 518.84     799.02   2. HAP (hospital-acquired pneumonia) J18.9 486   3. Generalized weakness R53.1 780.79     Patient Active Problem List   Diagnosis   • Acute on chronic respiratory failure with hypoxia   • Tracheostomy in place   • Hepatitis C   • Diabetes mellitus   • Hyperlipidemia   • Sleep apnea, obstructive   • Hypertension   • Atrial fibrillation   • CHF (congestive heart failure)   • Renal disorder   • Pneumonia     Past Medical History   Diagnosis Date   • Anemia    • Atrial fibrillation    • CHF (congestive heart failure)    • Diabetes mellitus    • Hepatitis C    • Hyperlipidemia    • Hypertension    • Renal disorder    • Sleep apnea, obstructive    • Tracheostomy in place      Past Surgical History   Procedure Laterality Date   • Tracheostomy     • Gtube replacement            PT ASSESSMENT (last 72 hours)      PT Evaluation       17 1025 17 0845    Rehab Evaluation    Document Type evaluation  -MQ     Subjective Information no complaints;agree to therapy  -MQ     Patient Effort, Rehab Treatment good  -MQ     Symptoms Noted During/After Treatment fatigue;significant change in vital signs  -MQ     General Information    Patient Profile Review yes  -MQ     Onset of Illness/Injury or Date of Surgery Date 17  -MQ     Referring Physician Xochilt  -MQ     General Observations Pt supine in bed with trach T-piece in place, no acute distress.  -MQ     Pertinent History Of Current Problem Pt admitted from [facility] for worsening SOA and diagnosed with pneumonia.  -MQ     Precautions/Limitations  fall precautions;oxygen therapy device and L/min;other (see comments)   non-verbal 2' trach, pt unable to write  -MQ     Prior Level of Function --   Pt reports she was at [facility] and walking 4 months ago.  -MQ     Equipment Currently Used at Home oxygen;hospital bed  -MQ     Plans/Goals Discussed With patient;agreed upon  -MQ     Barriers to Rehab physical barrier  -MQ     Living Environment    Lives With facility resident  -MQ facility resident  -KT    Living Arrangements extended care facility  -MQ extended care facility  -KT    Home Accessibility no concerns  -MQ no concerns  -KT    Stair Railings at Home  none  -KT    Type of Financial/Environmental Concern  none  -KT    Transportation Available  ambulance  -KT    Clinical Impression    Date of Referral to PT 03/16/17  -MQ     Patient/Family Goals Statement Pt's goal is to be able to walk.  -MQ     Criteria for Skilled Therapeutic Interventions Met treatment indicated  -MQ     Impairments Found (describe specific impairments) aerobic capacity/endurance;ergonomics and body mechanics;gait, locomotion, and balance;muscle performance;sensory Integrity;other (see comments)   Pt reports numbness in RLE  -MQ     Rehab Potential fair, will monitor progress closely  -MQ     Vital Signs    Pretreatment Heart Rate (beats/min) 99  -MQ     Intratreatment Heart Rate (beats/min) 121  -MQ     Posttreatment Heart Rate (beats/min) 104  -MQ     Pre SpO2 (%) 100  -MQ     O2 Delivery Pre Treatment supplemental O2  -MQ     Intra SpO2 (%) 80  -MQ     O2 Delivery Intra Treatment supplemental O2  -MQ     Post SpO2 (%) 99  -MQ     O2 Delivery Post Treatment supplemental O2  -MQ     Pre Patient Position Supine  -MQ     Intra Patient Position Sitting  -MQ     Post Patient Position Supine  -MQ     Pain Assessment    Pain Assessment No/denies pain  -MQ     Cognitive Assessment/Intervention    Current Cognitive/Communication Assessment functional  -MQ     Orientation Status unable/difficult to  assess  -     Follows Commands/Answers Questions able to follow single-step instructions;100% of the time  -     Personal Safety mild impairment;decreased insight to deficits  -     Personal Safety Interventions fall prevention program maintained;gait belt;muscle strengthening facilitated;nonskid shoes/slippers when out of bed;supervised activity  -     ROM (Range of Motion)    General ROM no range of motion deficits identified  -     MMT (Manual Muscle Testing)    General MMT Assessment lower extremity strength deficits identified  -     General MMT Assessment Detail BLEs grossly 2+/5, except B ankle DF 1/5  -     Muscle Tone Assessment    Muscle Tone Assessment RUE  -     RUE Muscle Tone Assessment other (see comments)   tremors  -     Bed Mobility, Assessment/Treatment    Bed Mobility, Assistive Device bed rails;head of bed elevated;draw sheet  -     Bed Mobility, Roll Left, [place] maximum assist (25% patient effort);2 person assist required;verbal cues required;nonverbal cues required (demo/gesture)  -     Bed Mobility, Roll Right, [place] maximum assist (25% patient effort);2 person assist required;verbal cues required;nonverbal cues required (demo/gesture)  -     Bed Mobility, Scoot/Bridge, [place] dependent (less than 25% patient effort);2 person assist required;verbal cues required;nonverbal cues required (demo/gesture)  -     Bed Mob, Supine to Sit, [place] maximum assist (25% patient effort);2 person assist required;verbal cues required;nonverbal cues required (demo/gesture)  -     Bed Mob, Sit to Supine, [place] maximum assist (25% patient effort);2 person assist required;verbal cues required;nonverbal cues required (demo/gesture)  -     Bed Mobility, Safety Issues decreased use of arms for pushing/pulling;decreased use of legs for bridging/pushing;impaired trunk control for bed mobility  -     Bed Mobility, Impairments strength decreased;impaired  balance;motor control impaired;postural control impaired  -     Transfer Assessment/Treatment    Transfers, Sit-Stand [place] not appropriate to assess;other (see comments)   2' BLE weakness  -     Gait Assessment/Treatment    Gait, [place] Level not appropriate to assess;other (see comments)   2' BLE weakness  -     Balance Skills Training    Sitting-Level of Assistance Contact guard;Close supervision  -     Sitting-Balance Support Feet supported;Right upper extremity supported;Left upper extremity supported  -     Sitting-Balance Activities Trunk control activities  -     Sitting # of Minutes 10  -     Positioning and Restraints    Pre-Treatment Position in bed  -     Post Treatment Position bed  -     In Bed supine;call light within reach;encouraged to call for assist;heels elevated  -       03/16/17 0842       General Information    Equipment Currently Used at Home oxygen;hospital bed  -       User Key  (r) = Recorded By, (t) = Taken By, (c) = Cosigned By    Initials Name Provider Type     Liala Christiansen, RN Registered Nurse     Eleanor Summers, PT Physical Therapist          Physical Therapy Education     Title: PT OT SLP Therapies (Active)     Topic: Physical Therapy (Active)     Point: Mobility training (Done)    Learning Progress Summary    Learner Readiness Method Response Comment Documented by Status   Patient Acceptance E,D NR,MANUEL   03/16/17 1037 Done               Point: Body mechanics (Done)    Learning Progress Summary    Learner Readiness Method Response Comment Documented by Status   Patient Acceptance E,D NR,[facility] 03/16/17 1037 Done                      User Key     Initials Effective Dates Name Provider Type Discipline     12/11/15 -  Eleanor Summers, DANNY Physical Therapist PT                PT Recommendation and Plan  Anticipated Equipment Needs At Discharge:  (none)  Anticipated Discharge Disposition: extended care facility  Planned Therapy  Interventions: balance training, bed mobility training, home exercise program, patient/family education, strengthening  PT Frequency: 2-3 times/wk  Plan of Care Review  Plan Of Care Reviewed With: patient  Outcome Summary/Follow up Plan: Pt with diagnoses of respiratory failure, pneumonia, hep C, HTN, a fib, heart failure, kidney disorder, and DM presents with BLE weakness, impaired balance, and decreased level of independence with functional mobility. Pt would benefit from skilled PT services at this time to address these functional deficits.           IP PT Goals       03/16/17 [facility]          Bed Mobility PT LTG    Bed Mobility PT LTG, Date Established 03/16/17  -      Bed Mobility PT LTG, Time to Achieve 1 wk  -MQ      Bed Mobility PT LTG, Activity Type all bed mobility  -MQ      Bed Mobility PT LTG, [place] Level moderate assist (50% patient effort);2 person assist required  -      Bed Mobility PT Goal  LTG, Assist Device bed rails  -      Dynamic Sitting Balance PT LTG    Dynamic Sitting Balance PT LTG, Date Established 03/16/17  -      Dynamic Sitting Balance PT LTG, Time to Achieve 1 wk  -      Dynamic Sitting Balance PT LTG, [place] Level supervision required  -      Dynamic Sitting Balance PT LTG, Assist Device UE Support  -        User Key  (r) = Recorded By, (t) = Taken By, (c) = Cosigned By    Initials Name Provider Type     Eleanor Summers, PT Physical Therapist                Outcome Measures       03/16/17 1025          How much help from another person do you currently need...    Turning from your back to your side while in flat bed without using bedrails? 2  -MQ      Moving from lying on back to sitting on the side of a flat bed without bedrails? 2  -MQ      Moving to and from a bed to a chair (including a wheelchair)? 1  -MQ      Standing up from a chair using your arms (e.g., wheelchair, bedside chair)? 1  -MQ      Climbing 3-5 steps with a railing? 1  -MQ      To walk in  hospital room? 1  -      AM-PAC 6 Clicks Score 8  -MQ      Functional Assessment    Outcome Measure Options AM-PAC 6 Clicks Basic Mobility (PT)  -        User Key  (r) = Recorded By, (t) = Taken By, (c) = Cosigned By    Initials Name Provider Type    CAN Summers PT Physical Therapist           Time Calculation:         PT Charges       03/16/17 1042          Time Calculation    Start Time 1002  -      Stop Time 1025  -      Time Calculation (min) 23 min  -      PT Received On 03/16/17  -      PT - Next Appointment 03/17/17  -      PT Goal Re-Cert Due Date 03/23/17  -        User Key  (r) = Recorded By, (t) = Taken By, (c) = Cosigned By    Initials Name Provider Type    CAN Summers PT Physical Therapist          Therapy Charges for Today     Code Description Service Date Service Provider Modifiers Qty    31539564341 HC PT EVAL MOD COMPLEXITY 2 3/16/2017 Eleanor Summers, PT GP 1    66752592150 HC PT THER PROC EA 15 MIN 3/16/2017 Eleanor Summers PT GP 2    83559724247 HC PT THER SUPP EA 15 MIN 3/16/2017 Eleanor Summers, PT GP 2          PT G-Codes  Outcome Measure Options: AM-PAC 6 Clicks Basic Mobility (PT)      Eleanor Summers PT  3/16/2017

## 2017-03-16 NOTE — PROGRESS NOTES
Discharge Planning Assessment  [facility]     Patient Name: Lucy Sevilla  MRN: [id number]  Today's Date: 3/16/2017    Admit Date: 3/16/2017          Discharge Needs Assessment       03/16/17 1109    Living Environment    Lives With facility resident   from [facility] extended care facility    Quality Of Family Relationships supportive    Able to Return to Prior Living Arrangements yes    Discharge Needs Assessment    Concerns To Be Addressed no discharge needs identified    Readmission Within The Last 30 Days no previous admission in last 30 days    Anticipated Changes Related to Illness none    Equipment Currently Used at Home oxygen;hospital bed    Discharge Facility/Level Of Care Needs nursing facility, skilled   [facility]    Transportation Available ambulance    Discharge Planning Comments Jonathan Das ( son/POA) [phone]            Discharge Plan       03/16/17 1140    Case Management/Social Work Plan    Plan Return to [facility]- waiting on bed status     Patient/Family In Agreement With Plan yes    Additional Comments Facesheet verified.  Spoke with patient and she confirms that she will return to [facility] [place].  Message left for Aide/Dori to check on bed status.  Awaiting call back.  Transfer packet started and in CCP office.  Spoke with significant other, Jonathan Thiago ( [phone]) and he confirms patient will return  there as well.  CCP will follow.         Discharge Placement     Facility/Agency Request Status Selected? Address Phone Number Fax Number    [facility] Accepted    Yes [address] [address] [phone]                Demographic Summary       03/16/17 1108    Referral Information    Admission Type inpatient    Arrived From admitted as an inpatient    Referral Source admission list    Reason For Consult discharge planning    Contact Information    Permission Granted to  Share Information With family/designee    Comments Jonathan Das ( son/POA) [phone]    Primary Care Physician Information    Name Dr. Eloy Cancino            Functional Status       03/16/17 1109    Functional Status Current    Ambulation 4-->completely dependent    Transferring 4-->completely dependent    Toileting 4-->completely dependent    Bathing 4-->completely dependent    Dressing 4-->completely dependent    Eating 0-->independent    Communication 0-->understands/communicates without difficulty    Swallowing (if score 2 or more for any item, consult Rehab Services) 0-->swallows foods/liquids without difficulty    Change in Functional Status Since Onset of Current Illness/Injury no    Functional Status Prior    Ambulation 4-->completely dependent    Transferring 4-->completely dependent    Bathing 4-->completely dependent    Dressing 4-->completely dependent    Eating 4-->completely dependent    Communication 0-->understands/communicates without difficulty    Swallowing 0-->swallows foods/liquids without difficulty            Psychosocial     None            Abuse/Neglect     None            Legal     None            Substance Abuse     None            Patient Forms     None          Kaleigh Mendoza, RN

## 2017-03-16 NOTE — PLAN OF CARE
Problem: Patient Care Overview (Adult)  Goal: Plan of Care Review  Outcome: Ongoing (interventions implemented as appropriate)    03/16/17 1038   Coping/Psychosocial Response Interventions   Plan Of Care Reviewed With patient   Outcome Evaluation   Outcome Summary/Follow up Plan Pt with diagnoses of respiratory failure, pneumonia, hep C, HTN, a fib, heart failure, kidney disorder, and DM presents with BLE weakness, impaired balance, and decreased level of independence with functional mobility. Pt would benefit from skilled PT services at this time to address these functional deficits.          Problem: Inpatient Physical Therapy  Goal: Bed Mobility Goal LTG- PT  Outcome: Ongoing (interventions implemented as appropriate)    03/16/17 1038   Bed Mobility PT LTG   Bed Mobility PT LTG, Date Established 03/16/17   Bed Mobility PT LTG, Time to Achieve 1 wk   Bed Mobility PT LTG, Activity Type all bed mobility   Bed Mobility PT LTG, [place] Level moderate assist (50% patient effort);2 person assist required   Bed Mobility PT Goal LTG, Assist Device bed rails       Goal: Dynamic Sitting Balance Goal LTG- PT  Outcome: Ongoing (interventions implemented as appropriate)    03/16/17 1038   Dynamic Sitting Balance PT LTG   Dynamic Sitting Balance PT LTG, Date Established 03/16/17   Dynamic Sitting Balance PT LTG, Time to Achieve 1 wk   Dynamic Sitting Balance PT LTG, [place] Level supervision required   Dynamic Sitting Balance PT LTG, Assist Device UE Support

## 2017-03-16 NOTE — NURSING NOTE
Patient received from emergency department and transferred from the stretcher to the bed.  Patient on 15L 41% trach collar.  RT notified to set up oxygen.  Inline suctioning in place and suction set up.  Patient AAOx3, nonverbal related to tracheostomy.

## 2017-03-17 ENCOUNTER — APPOINTMENT (OUTPATIENT)
Dept: GENERAL RADIOLOGY | Facility: HOSPITAL | Age: 57
End: 2017-03-17

## 2017-03-17 PROBLEM — I50.32 CHRONIC DIASTOLIC CONGESTIVE HEART FAILURE (HCC): Status: ACTIVE | Noted: 2017-03-16

## 2017-03-17 PROBLEM — G93.1 ANOXIC ENCEPHALOPATHY (HCC): Status: ACTIVE | Noted: 2017-03-17

## 2017-03-17 PROBLEM — N17.9 ACUTE KIDNEY INJURY (HCC): Status: ACTIVE | Noted: 2017-03-17

## 2017-03-17 PROBLEM — E66.01 MORBID OBESITY WITH BMI OF 50.0-59.9, ADULT (HCC): Status: ACTIVE | Noted: 2017-03-17

## 2017-03-17 PROBLEM — G25.0 ESSENTIAL TREMOR: Status: ACTIVE | Noted: 2017-03-17

## 2017-03-17 PROBLEM — J15.9 HEALTHCARE ASSOCIATED BACTERIAL PNEUMONIA: Status: ACTIVE | Noted: 2017-03-17

## 2017-03-17 LAB
ANION GAP SERPL CALCULATED.3IONS-SCNC: 16.2 MMOL/L
BASOPHILS # BLD AUTO: 0.05 10*3/MM3 (ref 0–0.2)
BASOPHILS NFR BLD AUTO: 0.7 % (ref 0–1.5)
BUN BLD-MCNC: 45 MG/DL (ref 6–20)
BUN/CREAT SERPL: 25.1 (ref 7–25)
CALCIUM SPEC-SCNC: 9.5 MG/DL (ref 8.6–10.5)
CHLORIDE SERPL-SCNC: 98 MMOL/L (ref 98–107)
CO2 SERPL-SCNC: 25.8 MMOL/L (ref 22–29)
CREAT BLD-MCNC: 1.79 MG/DL (ref 0.57–1)
DEPRECATED RDW RBC AUTO: 55.3 FL (ref 37–54)
EOSINOPHIL # BLD AUTO: 0.25 10*3/MM3 (ref 0–0.7)
EOSINOPHIL NFR BLD AUTO: 3.6 % (ref 0.3–6.2)
ERYTHROCYTE [DISTWIDTH] IN BLOOD BY AUTOMATED COUNT: 17.2 % (ref 11.7–13)
GFR SERPL CREATININE-BSD FRML MDRD: 30 ML/MIN/1.73
GLUCOSE BLD-MCNC: 140 MG/DL (ref 65–99)
GLUCOSE BLDC GLUCOMTR-MCNC: 179 MG/DL (ref 70–130)
GLUCOSE BLDC GLUCOMTR-MCNC: 180 MG/DL (ref 70–130)
GLUCOSE BLDC GLUCOMTR-MCNC: 186 MG/DL (ref 70–130)
HCT VFR BLD AUTO: 33.9 % (ref 35.6–45.5)
HGB BLD-MCNC: 10.7 G/DL (ref 11.9–15.5)
IMM GRANULOCYTES # BLD: 0 10*3/MM3 (ref 0–0.03)
IMM GRANULOCYTES NFR BLD: 0 % (ref 0–0.5)
LYMPHOCYTES # BLD AUTO: 1.43 10*3/MM3 (ref 0.9–4.8)
LYMPHOCYTES NFR BLD AUTO: 20.8 % (ref 19.6–45.3)
MCH RBC QN AUTO: 27.9 PG (ref 26.9–32)
MCHC RBC AUTO-ENTMCNC: 31.6 G/DL (ref 32.4–36.3)
MCV RBC AUTO: 88.5 FL (ref 80.5–98.2)
MONOCYTES # BLD AUTO: 0.34 10*3/MM3 (ref 0.2–1.2)
MONOCYTES NFR BLD AUTO: 4.9 % (ref 5–12)
NEUTROPHILS # BLD AUTO: 4.82 10*3/MM3 (ref 1.9–8.1)
NEUTROPHILS NFR BLD AUTO: 70 % (ref 42.7–76)
PLATELET # BLD AUTO: 214 10*3/MM3 (ref 140–500)
PMV BLD AUTO: 9.7 FL (ref 6–12)
POTASSIUM BLD-SCNC: 4.3 MMOL/L (ref 3.5–5.2)
RBC # BLD AUTO: 3.83 10*6/MM3 (ref 3.9–5.2)
SODIUM BLD-SCNC: 140 MMOL/L (ref 136–145)
WBC NRBC COR # BLD: 6.89 10*3/MM3 (ref 4.5–10.7)

## 2017-03-17 PROCEDURE — 63710000001 INSULIN ASPART PER 5 UNITS: Performed by: HOSPITALIST

## 2017-03-17 PROCEDURE — 25010000002 VANCOMYCIN: Performed by: HOSPITALIST

## 2017-03-17 PROCEDURE — 85025 COMPLETE CBC W/AUTO DIFF WBC: CPT | Performed by: HOSPITALIST

## 2017-03-17 PROCEDURE — 80048 BASIC METABOLIC PNL TOTAL CA: CPT | Performed by: HOSPITALIST

## 2017-03-17 PROCEDURE — 99254 IP/OBS CNSLTJ NEW/EST MOD 60: CPT | Performed by: PSYCHIATRY & NEUROLOGY

## 2017-03-17 PROCEDURE — 74230 X-RAY XM SWLNG FUNCJ C+: CPT

## 2017-03-17 PROCEDURE — 87205 SMEAR GRAM STAIN: CPT | Performed by: HOSPITALIST

## 2017-03-17 PROCEDURE — 87070 CULTURE OTHR SPECIMN AEROBIC: CPT | Performed by: HOSPITALIST

## 2017-03-17 PROCEDURE — 94799 UNLISTED PULMONARY SVC/PX: CPT

## 2017-03-17 PROCEDURE — 36415 COLL VENOUS BLD VENIPUNCTURE: CPT | Performed by: HOSPITALIST

## 2017-03-17 PROCEDURE — 25010000002 PIPERACILLIN SOD-TAZOBACTAM PER 1 G: Performed by: HOSPITALIST

## 2017-03-17 PROCEDURE — 82962 GLUCOSE BLOOD TEST: CPT

## 2017-03-17 PROCEDURE — 92611 MOTION FLUOROSCOPY/SWALLOW: CPT

## 2017-03-17 PROCEDURE — 25010000002 ENOXAPARIN PER 10 MG: Performed by: HOSPITALIST

## 2017-03-17 RX ORDER — PRIMIDONE 50 MG/1
50 TABLET ORAL 2 TIMES DAILY
Status: DISCONTINUED | OUTPATIENT
Start: 2017-03-17 | End: 2017-03-22 | Stop reason: HOSPADM

## 2017-03-17 RX ADMIN — TAZOBACTAM SODIUM AND PIPERACILLIN SODIUM 3.38 G: 375; 3 INJECTION, SOLUTION INTRAVENOUS at 23:32

## 2017-03-17 RX ADMIN — POLYETHYLENE GLYCOL 3350 17 G: 17 POWDER, FOR SOLUTION ORAL at 08:50

## 2017-03-17 RX ADMIN — ARFORMOTEROL TARTRATE 15 MCG: 15 SOLUTION RESPIRATORY (INHALATION) at 09:47

## 2017-03-17 RX ADMIN — INSULIN DETEMIR 20 UNITS: 100 INJECTION, SOLUTION SUBCUTANEOUS at 17:35

## 2017-03-17 RX ADMIN — LANTHANUM CARBONATE 500 MG: 500 TABLET, CHEWABLE ORAL at 08:35

## 2017-03-17 RX ADMIN — METOPROLOL TARTRATE 25 MG: 25 TABLET ORAL at 08:35

## 2017-03-17 RX ADMIN — IPRATROPIUM BROMIDE AND ALBUTEROL SULFATE 3 ML: .5; 3 SOLUTION RESPIRATORY (INHALATION) at 09:05

## 2017-03-17 RX ADMIN — VANCOMYCIN HYDROCHLORIDE 2250 MG: 1 INJECTION, POWDER, LYOPHILIZED, FOR SOLUTION INTRAVENOUS at 08:35

## 2017-03-17 RX ADMIN — DILTIAZEM HYDROCHLORIDE 60 MG: 60 TABLET, FILM COATED ORAL at 01:29

## 2017-03-17 RX ADMIN — TAZOBACTAM SODIUM AND PIPERACILLIN SODIUM 3.38 G: 375; 3 INJECTION, SOLUTION INTRAVENOUS at 16:00

## 2017-03-17 RX ADMIN — BUDESONIDE 0.5 MG: 0.5 INHALANT RESPIRATORY (INHALATION) at 23:55

## 2017-03-17 RX ADMIN — INSULIN ASPART 2 UNITS: 100 INJECTION, SOLUTION INTRAVENOUS; SUBCUTANEOUS at 17:35

## 2017-03-17 RX ADMIN — IPRATROPIUM BROMIDE AND ALBUTEROL SULFATE 3 ML: .5; 3 SOLUTION RESPIRATORY (INHALATION) at 15:54

## 2017-03-17 RX ADMIN — FAMOTIDINE 10 MG: 20 TABLET, FILM COATED ORAL at 17:35

## 2017-03-17 RX ADMIN — INSULIN ASPART 2 UNITS: 100 INJECTION, SOLUTION INTRAVENOUS; SUBCUTANEOUS at 20:31

## 2017-03-17 RX ADMIN — BUDESONIDE 0.5 MG: 0.5 INHALANT RESPIRATORY (INHALATION) at 09:05

## 2017-03-17 RX ADMIN — METOPROLOL TARTRATE 25 MG: 25 TABLET ORAL at 17:35

## 2017-03-17 RX ADMIN — IPRATROPIUM BROMIDE AND ALBUTEROL SULFATE 3 ML: .5; 3 SOLUTION RESPIRATORY (INHALATION) at 21:00

## 2017-03-17 RX ADMIN — ARFORMOTEROL TARTRATE 15 MCG: 15 SOLUTION RESPIRATORY (INHALATION) at 23:55

## 2017-03-17 RX ADMIN — DILTIAZEM HYDROCHLORIDE 60 MG: 60 TABLET, FILM COATED ORAL at 16:00

## 2017-03-17 RX ADMIN — GUAIFENESIN 600 MG: 600 TABLET, EXTENDED RELEASE ORAL at 08:35

## 2017-03-17 RX ADMIN — DILTIAZEM HYDROCHLORIDE 60 MG: 60 TABLET, FILM COATED ORAL at 08:35

## 2017-03-17 RX ADMIN — IPRATROPIUM BROMIDE AND ALBUTEROL SULFATE 3 ML: .5; 3 SOLUTION RESPIRATORY (INHALATION) at 12:14

## 2017-03-17 RX ADMIN — INSULIN ASPART 2 UNITS: 100 INJECTION, SOLUTION INTRAVENOUS; SUBCUTANEOUS at 11:35

## 2017-03-17 RX ADMIN — TAZOBACTAM SODIUM AND PIPERACILLIN SODIUM 3.38 G: 375; 3 INJECTION, SOLUTION INTRAVENOUS at 05:08

## 2017-03-17 RX ADMIN — INSULIN DETEMIR 20 UNITS: 100 INJECTION, SOLUTION SUBCUTANEOUS at 08:35

## 2017-03-17 RX ADMIN — GUAIFENESIN 600 MG: 600 TABLET, EXTENDED RELEASE ORAL at 17:35

## 2017-03-17 RX ADMIN — DILTIAZEM HYDROCHLORIDE 60 MG: 60 TABLET, FILM COATED ORAL at 20:31

## 2017-03-17 RX ADMIN — PRIMIDONE 50 MG: 50 TABLET ORAL at 17:35

## 2017-03-17 RX ADMIN — LANTHANUM CARBONATE 500 MG: 500 TABLET, CHEWABLE ORAL at 17:35

## 2017-03-17 RX ADMIN — ENOXAPARIN SODIUM 30 MG: 30 INJECTION SUBCUTANEOUS at 08:50

## 2017-03-17 RX ADMIN — ASPIRIN 81 MG: 81 TABLET ORAL at 08:35

## 2017-03-17 RX ADMIN — FAMOTIDINE 10 MG: 20 TABLET, FILM COATED ORAL at 08:35

## 2017-03-17 RX ADMIN — LANTHANUM CARBONATE 500 MG: 500 TABLET, CHEWABLE ORAL at 12:25

## 2017-03-17 NOTE — PROGRESS NOTES
Acute Care - Speech Language Pathology   Swallow Initial Evaluation [facility]     Patient Name: Lucy Sevilla  : 1960  MRN: [id number]  Today's Date: 3/17/2017  Onset of Illness/Injury or Date of Surgery Date: 17            Admit Date: 3/16/2017  SPEECH-LANGUAGE PATHOLOGY EVALUTION - VFSS  Subjective: The patient was seen on this date for a VFSS(Videofluoroscopic Swallowing Study).  Patient was alert and cooperative.    Objective: Risks/benefits were reviewed with the patient, and consent was obtained. The study was completed with SLP present and Radiologist review. The patient was seen in lateral view(s). Textures given included thin liquid, puree consistency, mechanical soft consistency and regular consistency.  Assessment: Minimal oropharyngeal dysphagia characterized by posterior spillage resulting in inconsistent transient shallow silent penetration of thin liquids during the swallow.  Pt did have reduced hyolaryngeal excursion, however no residue remained within the pharynx after the swallow.    Comments: Pt was trialed w/ the PMV on & off throughout the eval, across different consistencies & liquids.   Recommendations: Diet Textures: thin liquid, regular consistency food. Medications should be taken whole with thin liquids.   Recommended Strategies: Upright for PO and small bites and sips. Oral care before breakfast, after all meals and PRN.  Dysphagia therapy is not recommended. Rationale: swallow is at baseline.    Visit Dx:     ICD-10-CM ICD-9-CM   1. Acute on chronic respiratory failure with hypoxia J96.21 518.84     799.02   2. HAP (hospital-acquired pneumonia) J18.9 486   3. Generalized weakness R53.1 780.79     Patient Active Problem List   Diagnosis   • Acute on chronic respiratory failure with hypoxia   • Tracheostomy in place   • Hepatitis C   • Type 2 diabetes mellitus without complication   • Hyperlipidemia   • Sleep apnea, obstructive   • Hypertension   • Atrial fibrillation   •  Chronic diastolic congestive heart failure   • Healthcare associated bacterial pneumonia - covering for gram-negative and MRSA organisms   • Acute kidney injury   • Morbid obesity with BMI of 50.0-59.9, adult     Past Medical History   Diagnosis Date   • Anemia    • Atrial fibrillation    • CHF (congestive heart failure)    • Diabetes mellitus    • Hepatitis C    • Hyperlipidemia    • Hypertension    • Renal disorder    • Sleep apnea, obstructive    • Tracheostomy in place      Past Surgical History   Procedure Laterality Date   • Tracheostomy     • Gtube replacement            SWALLOW EVALUATION (last 72 hours)      Swallow Evaluation       03/17/17 0928 03/16/17 1153             Rehab Evaluation    Document Type evaluation  -NB evaluation  -NB       Symptoms Noted During/After Treatment none  -NB none  -NB       General Information    Patient Profile Review yes  -NB yes  -NB       Current Diet Limitations thin liquids;regular solid  -NB thin liquids;regular solid  -NB       Precautions/Limitations, Vision  WFL  -NB       Precautions/Limitations, Hearing  WFL  -NB       Prior Level of Function- Communication  functional in all spheres;other (comment)   w/ PMV  -NB       Prior Level of Function- Swallowing  no diet consistency restrictions  -NB       Plans/Goals Discussed With patient  -NB patient  -NB       Barriers to Rehab medically complex  -NB medically complex  -NB       Clinical Impression    Patient's Goals For Discharge patient did not state  -NB patient did not state  -NB       SLP Swallowing Diagnosis other (see comments)   minimal oropharyngeal dysphagia  -NB other (see comments)   r/o oropharyngeal dysphagia  -NB       Rehab Potential/Prognosis, Swallowing  good, to achieve stated therapy goals  -NB       Criteria for Skilled Therapeutic Interventions Met no problems identified which require skilled intervention  -NB skilled criteria for dysphagia intervention met  -NB       Therapy Frequency evaluation  only  -NB PRN  -NB       Predicted Duration Therapy Interv (days)  until discharge  -NB       Expected Duration Therapy Session (min)  15-30 minutes  -NB       SLP Diet Recommendation regular textures;thin liquids  -NB regular textures;thin liquids  -NB       Recommended Diagnostics  VFSS (MBS)  -NB       Recommended Feeding/Eating Techniques small sips/bites  -NB small sips/bites  -NB       SLP Rec. for Method of Medication Administration meds whole with thin liquid  -NB meds whole with thin liquid  -NB       Monitor For Signs Of Aspiration cough;gurgly voice;throat clearing  -NB right lower lobe infiltrates  -NB       Anticipated Discharge Disposition long term acute care facility  -NB skilled nursing facility  -NB       Pain Assessment    Pain Assessment No/denies pain  -NB No/denies pain  -NB       Cognitive Assessment/Intervention    Current Cognitive/Communication Assessment functional  -NB functional   for eval  -NB       Orientation Status oriented x 4  -NB oriented x 4  -NB       Follows Commands/Answers Questions 100% of the time  -% of the time  -NB       Oral Motor Structure and Function    Oral Motor Anatomy and Physiology patient demonstrates anatomy that is WNL  -NB patient demonstrates anatomy that is WNL  -NB       Dentition Assessment  present and adequate  -NB       Secretion Management  requires suctioning to control secretions;other (see comments)   via trach  -NB       Mucosal Quality  moist, healthy  -NB       Velar Elevation  WNL (within normal limits)  -NB       Volitional Swallow  no difficulties initiating volitional swallow  -NB       Volitional Cough  no difficulties initiating volitional cough  -NB       Oral Musculature General Assessment  WNL (within normal limits)  -NB       SLP Communication to Radiology    Summary Statement Minimal oropharyngeal dysphagia characterized by inconsistent transient shallow silent penetration of thin liquids during the swallow.    -NB           User Key  (r) = Recorded By, (t) = Taken By, (c) = Cosigned By    Initials Name Effective Dates    SE Espinosa MS CCC-SLP 04/13/15 -         EDUCATION  The patient has been educated in the following areas:   Dysphagia (Swallowing Impairment).    SLP Recommendation and Plan  SLP Swallowing Diagnosis: other (see comments) (minimal oropharyngeal dysphagia)  SLP Diet Recommendation: regular textures, thin liquids  Recommended Feeding/Eating Techniques: small sips/bites  SLP Rec. for Method of Medication Administration: meds whole with thin liquid  Monitor For Signs Of Aspiration: cough, gurgly voice, throat clearing  [place] [place]  Criteria for Skilled Therapeutic Interventions Met: no problems identified which require skilled intervention  Anticipated Discharge Disposition: long term acute care facility  Rehab Potential/Prognosis, Swallowing: good, to achieve stated therapy goals  Therapy Frequency: evaluation only             Plan of Care Review  Plan Of Care Reviewed With: patient  Progress: improving  Outcome Summary/Follow up Plan: VFSS: Minimal oropharyngeal dysphagia.  SLP to s/o, Rec: REgular & thin          IP SLP Goals       03/17/17 0927 03/16/17 1151       Safely Consume Diet    Safely Consume Diet- SLP, Date Established  03/16/17  -NB     Safely Consume Diet- SLP, Time to Achieve  by discharge  -NB     Safely Consume Diet- SLP, Outcome goal met  -NB        User Key  (r) = Recorded By, (t) = Taken By, (c) = Cosigned By    Initials Name Provider Type    NB Asia SanchezMS joana CCC-SLP Speech and Language Pathologist             SLP Outcome Measures (last 72 hours)      SLP Outcome Measures       03/17/17 0928 03/16/17 1151       SLP Outcome Measures    Outcome Measure Used? Adult NOMS  -NB Adult NOMS  -NB     FCM Scores    FCM Chosen Swallowing  -NB Swallowing  -NB     Swallowing FCM Score 7  -NB 7  -NB       User Key  (r) = Recorded By, (t) = Taken By, (c) = Cosigned By    Initials  Name Effective Dates    SE Espinosa MS CCC-SLP 04/13/15 -            Time Calculation:         Time Calculation- SLP       03/17/17 0933          Time Calculation- SLP    SLP Received On 03/17/17  -NB        User Key  (r) = Recorded By, (t) = Taken By, (c) = Cosigned By    Initials Name Provider Type    SE Espinosa MS CCC-SLP Speech and Language Pathologist          Therapy Charges for Today     Code Description Service Date Service Provider Modifiers Qty    13289401817 HC ST EVAL ORAL PHARYNG SWALLOW 4 3/16/2017 Asia Espinosa MS CCC-SLP GN 1    92492232028 HC ST MOTION FLUORO EVAL SWALLOW 5 3/17/2017 Asia Espinosa MS CCC-SLP GN 1               Asia Espinosa MS CCC-SLP  3/17/2017

## 2017-03-17 NOTE — PLAN OF CARE
Problem: Patient Care Overview (Adult)  Goal: Plan of Care Review    03/17/17 0833   Coping/Psychosocial Response Interventions   Plan Of Care Reviewed With patient   Patient Care Overview   Progress progress toward functional goals as expected   Outcome Evaluation   Outcome Summary/Follow up Plan Patient at swallow study right now; Patient on 5L of O2 per nasal cannulaPatient slept through the night , no complaints of pain; will continue to monitor       Goal: Adult Individualization and Mutuality  Outcome: Ongoing (interventions implemented as appropriate)  Goal: Discharge Needs Assessment  Outcome: Ongoing (interventions implemented as appropriate)    Problem: Respiratory Insufficiency (Adult)  Goal: Identify Related Risk Factors and Signs and Symptoms  Outcome: Outcome(s) achieved Date Met:  03/17/17  Goal: Acid/Base Balance  Outcome: Ongoing (interventions implemented as appropriate)  Goal: Effective Ventilation  Outcome: Ongoing (interventions implemented as appropriate)    Problem: Skin Integrity Impairment, Risk/Actual (Adult)  Goal: Identify Related Risk Factors and Signs and Symptoms  Outcome: Outcome(s) achieved Date Met:  03/17/17  Goal: Skin Integrity/Wound Healing  Outcome: Ongoing (interventions implemented as appropriate)    Problem: Fall Risk (Adult)  Goal: Identify Related Risk Factors and Signs and Symptoms  Outcome: Ongoing (interventions implemented as appropriate)  Goal: Absence of Falls  Outcome: Ongoing (interventions implemented as appropriate)

## 2017-03-17 NOTE — CONSULTS
Patient Identification:  NAME:  Lucy Sevilla  Age:  56 y.o.   Sex:  female   :  1960   MRN:  [id number]       Chief complaint: Does not have one,reason for consult tremor    History of present illness:  This patient is a 56-year-old right-handed white female to history of anemia A. fib congestive heart failure diabetes hepatitis C hyperlipidemia hypertension who is has come to the hospital with significant respiratory problems she has a tracheostomy and has had bronchoscopy in the recent that showed some skin overgrowth that has to be laser she was going to be sent to [facility] but ended up in [facility] where she had a CODE BLUE and resuscitated.  Since that time the boyfriend is noted she has increased tremor she notes she has them in both upper extremities may be right greater than the left and it is bothersome to her when she is trying to eat context the patient who is coated duration of the tremor is been since that time no associated symptoms and no modifying factors quality is a tremor that is present at rest but may be more present when she is using her hands particularly when she is trying to eat      Past medical history:  Past Medical History   Diagnosis Date   • Anemia    • Atrial fibrillation    • CHF (congestive heart failure)    • Diabetes mellitus    • Hepatitis C    • Hyperlipidemia    • Hypertension    • Renal disorder    • Sleep apnea, obstructive    • Tracheostomy in place        Past surgical history:  Past Surgical History   Procedure Laterality Date   • Tracheostomy     • Gtube replacement         Allergies:  Codeine    Home medications:  Prescriptions Prior to Admission   Medication Sig Dispense Refill Last Dose   • acetaminophen (TYLENOL) 325 MG tablet 650 mg by Per G Tube route Every 6 (Six) Hours As Needed for Mild Pain (1-3).      • albuterol (ACCUNEB) 0.63 MG/3ML nebulizer solution Take 1 ampule by nebulization Every 4 (Four) Hours As Needed for Wheezing.      •  albuterol (ACCUNEB) 0.63 MG/3ML nebulizer solution Take 1 ampule by nebulization Every 6 (Six) Hours. 1 dose via trach every six hours related to chronic obstructive pulmonary disease      • arformoterol (BROVANA) 15 MCG/2ML nebulizer solution Take  by nebulization 2 (Two) Times a Day. One dose via trach every twelve hours      • aspirin 81 MG EC tablet 81 mg by Per G Tube route Daily.      • B Complex-C-Folic Acid (JOY-MARLENY PO) 1 tablet by Per G Tube route Daily.      • budesonide (PULMICORT) 0.5 MG/2ML nebulizer solution Take 0.5 mg by nebulization 2 (Two) Times a Day. 1 puff inhale orally two times a day      • diltiaZEM (CARDIZEM) 60 MG tablet 60 mg by Per G Tube route 3 (Three) Times a Day.      • enoxaparin (LOVENOX) 40 MG/0.4ML solution syringe Inject 40 mg under the skin Daily.      • famotidine (PEPCID) 20 MG tablet 20 mg by Per G Tube route 2 (Two) Times a Day.      • furosemide (LASIX) 20 MG tablet 20 mg by Per G Tube route Daily.      • haloperidol (HALDOL) 5 MG tablet 2.5 mg by Per G Tube route 2 (Two) Times a Day As Needed for Agitation. Give 1/2 tablet every twelve hours as needed for agitation      • insulin detemir (LEVEMIR) 100 UNIT/ML injection Inject 20 Units under the skin 2 (Two) Times a Day.      • insulin NPH-insulin regular (NOVOLIN 70/30) (70-30) 100 UNIT/ML injection Inject  under the skin 2 (Two) Times a Day With Meals. Sliding scale      • ipratropium-albuterol (COMBIVENT)  MCG/ACT inhaler Inhale 1 puff Every 6 (Six) Hours As Needed for Wheezing. 1 dose via trach every six hours as needed for SOA      • Lanthanum Carbonate 750 MG pack 750 mg by Per G Tube route Every 8 (Eight) Hours.      • levoFLOXacin (LEVAQUIN) 750 MG tablet 750 mg by Per G Tube route Daily.      • metoprolol tartrate (LOPRESSOR) 25 MG tablet 25 mg by Per G Tube route 2 (Two) Times a Day.      • ondansetron (ZOFRAN) 4 MG tablet 4 mg by Per G Tube route Every 4 (Four) Hours As Needed for Nausea or Vomiting.       • polyethylene glycol (MIRALAX) packet 17 g by Per G Tube route Daily.      • traMADol (ULTRAM) 50 MG tablet 50 mg by Per G Tube route Every 6 (Six) Hours As Needed for Moderate Pain (4-6).      • tuberculin (TUBERSOL) 5 UNIT/0.1ML injection Inject 5 Units into the skin Every Night.           Hospital medications:    arformoterol 15 mcg Nebulization BID - RT   aspirin 81 mg Oral Daily   budesonide 0.5 mg Nebulization BID - RT   diltiaZEM 60 mg Per G Tube TID   enoxaparin 30 mg Subcutaneous Daily   famotidine 10 mg Per G Tube BID   guaiFENesin 600 mg Oral BID   insulin aspart 0-7 Units Subcutaneous 4x Daily AC & at Bedtime   insulin detemir 20 Units Subcutaneous BID   ipratropium-albuterol 3 mL Nebulization 4x Daily - RT   lanthanum 500 mg Oral TID With Meals   metoprolol tartrate 25 mg Per G Tube BID   piperacillin-tazobactam 3.375 g Intravenous Q8H   polyethylene glycol 17 g Per G Tube Daily   vancomycin 15 mg/kg Intravenous Q24H       Pharmacy to dose vancomycin      •  acetaminophen  •  dextrose  •  dextrose  •  glucagon (human recombinant)  •  haloperidol  •  ondansetron  •  Pharmacy to dose vancomycin  •  sodium chloride  •  Insert peripheral IV **AND** sodium chloride  •  traMADol    Family history:  Family History   Problem Relation Age of Onset   • COPD Mother    • Heart disease Mother        Social history:  Social History   Substance Use Topics   • Smoking status: Former Smoker   • Smokeless tobacco: None   • Alcohol use No       Review of systems:    She cannot answer any of this she has a trach and cannot speak.  I've reviewed the chart she has no family members present    Objective:  Vitals Ranges:   Temp:  [98.3 °F (36.8 °C)] 98.3 °F (36.8 °C)  Heart Rate:  [67-91] 67  Resp:  [20-26] 20  BP: (113-133)/() 129/88      Physical Exam:  Awake alert has a trach whispers a few things fund of knowledge very difficult to tell awake alert oriented but fairly well at this time I believe fund of knowledge  cannot be assessed attention span and concentration good recent and remote memory cannot be addressed language function as noted she is not a phasic she doesn't trach pupils to constricting to 1 normal disc retinas visual fields she couldn't follow nasolabial folds palate tongue symmetrical decreased hearing normal facial sensation head turning and shoulder shrug was not tested motor she has tremor in both upper extremities some asterixis andmyoclonus, and moves both upper extremities fairly well or externally she has toe wiggle reflexes trace throughout symmetrical toes downgoing bilaterally no atrophy fasciculations there is mild rigidity and bradykinesia she tends to have floating hands.  Sensation normal light touch face both arms and both legs coordination tremor in the upper extremities really had trouble doing this station and gait could not be tested for fear she would fall heart regular without murmur neck supple without bruits    Results review:   I reviewed the patient's new clinical results.    Data review:  Lab Results (last 24 hours)     Procedure Component Value Units Date/Time    Potassium [[id number]]  (Normal) Collected:  03/16/17 1540    Specimen:  Blood Updated:  03/16/17 1627     Potassium 4.4 mmol/L     Magnesium [[id number]]  (Normal) Collected:  03/16/17 1540    Specimen:  Blood Updated:  03/16/17 1627     Magnesium 2.0 mg/dL     POC Glucose Fingerstick [[id number]]  (Normal) Collected:  03/16/17 1651    Specimen:  Blood Updated:  03/16/17 1654     Glucose 116 mg/dL     Narrative:       Meter: [id number] : [id number] Chau Goldstein    POC Glucose Fingerstick [[id number]]  (Abnormal) Collected:  03/16/17 2158    Specimen:  Blood Updated:  03/16/17 2159     Glucose 140 (H) mg/dL     Narrative:       Meter: [id number] : [id number] David Sanchez    Blood Culture [[id number]]  (Normal) Collected:  03/16/17 0348    Specimen:  Blood from Arm, Left Updated:  03/17/17 0401     Blood Culture No growth  at 24 hours     Blood Culture [[id number]]  (Normal) Collected:  03/16/17 0419    Specimen:  Blood from Arm, Right Updated:  03/17/17 0501     Blood Culture No growth at 24 hours     CBC & Differential [[id number]] Collected:  03/17/17 0600    Specimen:  Blood Updated:  03/17/17 0641    Narrative:       The following orders were created for panel order CBC & Differential.  Procedure                               Abnormality         Status                     ---------                               -----------         ------                     CBC Auto Differential[[id number]]         Abnormal            Final result                 Please view results for these tests on the individual orders.    CBC Auto Differential [[id number]]  (Abnormal) Collected:  03/17/17 0600    Specimen:  Blood Updated:  03/17/17 0641     WBC 6.89 10*3/mm3      RBC 3.83 (L) 10*6/mm3      Hemoglobin 10.7 (L) g/dL      Hematocrit 33.9 (L) %      MCV 88.5 fL      MCH 27.9 pg      MCHC 31.6 (L) g/dL      RDW 17.2 (H) %      RDW-SD 55.3 (H) fl      MPV 9.7 fL      Platelets 214 10*3/mm3      Neutrophil % 70.0 %      Lymphocyte % 20.8 %      Monocyte % 4.9 (L) %      Eosinophil % 3.6 %      Basophil % 0.7 %      Immature Grans % 0.0 %      Neutrophils, Absolute 4.82 10*3/mm3      Lymphocytes, Absolute 1.43 10*3/mm3      Monocytes, Absolute 0.34 10*3/mm3      Eosinophils, Absolute 0.25 10*3/mm3      Basophils, Absolute 0.05 10*3/mm3      Immature Grans, Absolute 0.00 10*3/mm3     Basic Metabolic Panel [[id number]]  (Abnormal) Collected:  03/17/17 0600    Specimen:  Blood Updated:  03/17/17 0700     Glucose 140 (H) mg/dL      BUN 45 (H) mg/dL      Creatinine 1.79 (H) mg/dL      Sodium 140 mmol/L      Potassium 4.3 mmol/L      Chloride 98 mmol/L      CO2 25.8 mmol/L      Calcium 9.5 mg/dL      eGFR Non African Amer 30 (L) mL/min/1.73      BUN/Creatinine Ratio 25.1 (H)      Anion Gap 16.2 mmol/L     Narrative:       GFR Normal >60  Chronic Kidney Disease  <60  Kidney Failure <15    POC Glucose Fingerstick [[id number]]  (Abnormal) Collected:  03/17/17 1111    Specimen:  Blood Updated:  03/17/17 1114     Glucose 180 (H) mg/dL     Narrative:       Meter: [id number] : [id number] Alison Mcguire    Respiratory Culture [[id number]] Collected:  03/17/17 0910    Specimen:  Sputum from ET Suction Updated:  03/17/17 1204     Gram Stain Result Occasional WBCs seen              Occasional Mixed bacterial morphotypes seen on Gram Stain           Imaging:  Imaging Results (last 24 hours)     Procedure Component Value Units Date/Time    XR Chest 1 View [[id number]] Collected:  03/16/17 0729     Updated:  03/16/17 2055    Narrative:       1-VIEW PORTABLE CHEST     HISTORY: Shortness of breath.     FINDINGS: The patient is rotated to the right. The examination is  somewhat limited by the patient's marked obesity and rotation. There is  some slight haziness in the lower left chest which I suspect relates to  the overlying soft tissues but could potentially relate to an area of  developing pneumonia. The heart is prominent in size. A tracheostomy  tube is in place.     This report was finalized on 3/16/2017 8:52 PM by Dr. Quan Fernandez MD.       FL Video Swallow [[id number]] Collected:  03/17/17 1324     Updated:  03/17/17 1324    Narrative:       VIDEO ESOPHAGRAM 03/17/2017     HISTORY: Dysphagia.     FINDINGS: Fluoroscopy was used during performance of the video  esophagram by speech pathology. There is minimal oropharyngeal dysphagia  with inconsistent transient swallow and silent penetration of thin  liquids. Please see full speech pathology report. Fluoroscopy time 2  minutes 5 seconds.                Assessment and Plan:     Principal Problem:    Healthcare associated bacterial pneumonia - covering for gram-negative and MRSA organisms  Active Problems:    Acute on chronic respiratory failure with hypoxia    Tracheostomy in place    Hepatitis C    Type 2 diabetes mellitus without  complication    Sleep apnea, obstructive    Hypertension    Atrial fibrillation    Chronic diastolic congestive heart failure    Acute kidney injury    Morbid obesity with BMI of 50.0-59.9, adult    This patient has metabolic encephalopathy, myoclonus in combination.  It is noted that she is had significant increased tremor since she coded, consistent with an anoxic encephalopathy and tremor associated with that.  I would like to add a tiny dose of Mysoline at this time to see if we can help with the essential tremor.  She does have mild rigidity and bradykinesia but I will concentrate on the tremor.      Micha Cortés MD  03/17/17  1:39 PM

## 2017-03-17 NOTE — PROGRESS NOTES
Daily Progress Note.     Lucy Sevilla  56 y.o.  female  3/17/2017    Brief problem (summary)  This is a 56-year-old female with a complicated medical history. Normally she is cared by Dr. Hall at [place] for her pulmonary problems and sleep apnea. She has been in and out of the hospital many times and this is #4 trach. Recently he did a bronchoscopy and told her that there is a skin overgrowth that needs to be lasered. He was planning to send her to [facility] for laser procedure but she ended up in [facility]. While she was in [place] she had a CODE BLUE and was resuscitated. The boyfriend reports that after the code patient developed this tremors. She was sent to a rehabilitation and she has been in the rehabilitation for 4 weeks when she found to have increasing lung condition, cough requiring 15 L of oxygen by trach mask and sent to the emergency room. The chest x-ray shows infiltrate consistent pneumonia and she has been started on Zosyn and vancomycin. Patient unable to give me much history unfortunately. She has a past medical history of sleep apnea, chronic hypoxic respiratory failure, atrial fibrillation, CHF, diabetes mellitus, hepatitis C, hypertension and hyperlipidemia.    Today, looks lot better    PMS/FH/SH: reviewed and unchanged.  Medications:     arformoterol 15 mcg Nebulization BID - RT   aspirin 81 mg Oral Daily   budesonide 0.5 mg Nebulization BID - RT   diltiaZEM 60 mg Per G Tube TID   enoxaparin 30 mg Subcutaneous Daily   famotidine 10 mg Per G Tube BID   guaiFENesin 600 mg Oral BID   insulin aspart 0-7 Units Subcutaneous 4x Daily AC & at Bedtime   insulin detemir 20 Units Subcutaneous BID   ipratropium-albuterol 3 mL Nebulization 4x Daily - RT   lanthanum 500 mg Oral TID With Meals   metoprolol tartrate 25 mg Per G Tube BID   piperacillin-tazobactam 3.375 g Intravenous Q8H   polyethylene glycol 17 g Per G Tube Daily   vancomycin 15 mg/kg Intravenous Q24H       Pharmacy to  dose vancomycin        ROS:  Respiratory ROS: NA    Objective:  Temp:  [98.3 °F (36.8 °C)] 98.3 °F (36.8 °C)  I/O last 3 completed shifts:  In: 1070 [P.O.:840; Other:30; NG/GT:100; IV Piggyback:100]  Out: -   I/O this shift:  In: 200 [Other:200]  Out: -     Physical Exam   Constitutional: She is oriented to person, place, and time. She appears well-developed and well-nourished.   HENT:   Head: Atraumatic.   Eyes: Pupils are equal, round, and reactive to light. No scleral icterus.   Neck:   Trach   Cardiovascular: Normal rate and regular rhythm.    Pulmonary/Chest: No accessory muscle usage. No respiratory distress. She has decreased breath sounds. She has no wheezes.   Abdominal: Soft. Normal appearance and bowel sounds are normal. She exhibits no ascites. There is no hepatosplenomegaly.   Neurological: She is alert and oriented to person, place, and time.   Skin: No laceration and no petechiae noted. No cyanosis. Nails show no clubbing.   Psychiatric: She has a normal mood and affect. Her behavior is normal.   Vitals reviewed.      Results from last 7 days  Lab Units 03/17/17  0600 03/16/17  0644 03/16/17  0232   WBC 10*3/mm3 6.89 7.47 8.35   HEMOGLOBIN g/dL 10.7* 11.6* 11.7*   HEMATOCRIT % 33.9* 36.6 37.2   PLATELETS 10*3/mm3 214 217 266       Results from last 7 days  Lab Units 03/17/17  0600 03/16/17  1540 03/16/17  0644 03/16/17  0232   SODIUM mmol/L 140  --  140 139   POTASSIUM mmol/L 4.3 4.4 4.8 4.5   CHLORIDE mmol/L 98  --  98 98   TOTAL CO2 mmol/L 25.8  --  28.1 28.0   BUN mg/dL 45*  --  53* 49*   CREATININE mg/dL 1.79*  --  2.28* 2.39*   GLUCOSE mg/dL 140*  --  131* 150*   CALCIUM mg/dL 9.5  --  9.5 9.6               ASSESSMENT/ PLAN:  · Acute on chronic hypoxic respiratory failure, patient is requiring more oxygen than normal. Continue on weaning the oxygen.  · Healthcare associated pneumonia, I agree with antibiotics choice. Patient is being in the hospital, nursing homes for most part of the  year.  · Tremors,  the boyfriend states that these started after the CODE BLUE at [place]. I will ask neurology to see her and assist  · History of sleep apnea, she has tracheostomy and does not require any positive pressures .  · Diabetes mellitus  · History of congestive heart failure  · Renal failure, improving Cr  · History of atrial fibrillation  · He states hepatitis C   ·        Karl Sousa MD,[facility]  Pulmonary, Critical Care and Sleep Medicine.  [facility]    3/17/2017    EMR Dragon/Transcription disclaimer:   Much of this encounter note is an electronic transcription/translation of spoken language to printed text. The electronic translation of spoken language may permit erroneous, or at times, nonsensical words or phrases to be inadvertently transcribed; Although I have reviewed the note for such errors, some may still exist.

## 2017-03-17 NOTE — SIGNIFICANT NOTE
03/17/17 1045   Rehab Treatment   Discipline physical therapy assistant   Treatment Not Performed patient/family decline treatment, pt not feeling well  (Pt to be seen on Monday 3/20/17 by PT)   Recommendation   PT - Next Appointment 03/20/17

## 2017-03-17 NOTE — PLAN OF CARE
Problem: Patient Care Overview (Adult)  Goal: Plan of Care Review  Outcome: Ongoing (interventions implemented as appropriate)    03/17/17 4930   Coping/Psychosocial Response Interventions   Plan Of Care Reviewed With patient   Patient Care Overview   Progress no change   Outcome Evaluation   Outcome Summary/Follow up Plan frequent requests to be suctioned--not much out via trach. very demanding at times. remains on vanc and zosyn. meds via peg. regular diet per video swallow. no other issues--DANI Martinez RN       Goal: Adult Individualization and Mutuality  Outcome: Ongoing (interventions implemented as appropriate)  Goal: Discharge Needs Assessment  Outcome: Ongoing (interventions implemented as appropriate)    Problem: Fall Risk (Adult)  Goal: Identify Related Risk Factors and Signs and Symptoms  Outcome: Ongoing (interventions implemented as appropriate)  Goal: Absence of Falls  Outcome: Ongoing (interventions implemented as appropriate)

## 2017-03-17 NOTE — PLAN OF CARE
Problem: Patient Care Overview (Adult)  Goal: Plan of Care Review    03/17/17 0927   Coping/Psychosocial Response Interventions   Plan Of Care Reviewed With patient   Patient Care Overview   Progress improving   Outcome Evaluation   Outcome Summary/Follow up Plan VFSS: Minimal oropharyngeal dysphagia. SLP to s/o, Rec: REgular & thin         Problem: Inpatient SLP  Goal: Dysphagia- Patient will safely consume diet as per recommendation with no signs/symptoms of aspiration  Outcome: Outcome(s) achieved Date Met:  03/17/17 03/16/17 1151 03/17/17 0927   Safely Consume Diet   Safely Consume Diet- SLP, Date Established 03/16/17 --    Safely Consume Diet- SLP, Time to Achieve by discharge --    Safely Consume Diet- SLP, Outcome --  goal met

## 2017-03-17 NOTE — PROGRESS NOTES
"      Name: Lucy Sevilla ADMIT: 3/16/2017   : 1960  PCP: Eloy Cancino MD    MRN: [id number] LOS: 1 days   AGE/SEX: 56 y.o. female  ROOM: [facility]     Subjective   Doesn't feel well but better than upon admission.  Still short of breath.    Objective   Vital Signs  Temp:  [98.3 °F (36.8 °C)] 98.3 °F (36.8 °C)  Heart Rate:  [69-95] 83  Resp:  [18-26] 22  BP: (113-133)/() 129/88  SpO2:  [97 %-99 %] 97 %  on  Flow (L/min):  [5-9] 5;   O2 Device: T- piece  Body mass index is 51.97 kg/(m^2).    Intake/Output Summary (Last 24 hours) at 17 0850  Last data filed at 17 1833   Gross per 24 hour   Intake    970 ml   Output      0 ml   Net    970 ml     Wt Readings from Last 1 Encounters:   17 0634 (!) 322 lb (146 kg)   17 0159 (!) 374 lb (170 kg)     Wt Readings from Last 3 Encounters:   17 (!) 322 lb (146 kg)       Objective:  General Appearance:  Comfortable, in no acute distress and ill-appearing.    Vital signs: (most recent): Blood pressure 129/88, pulse 83, temperature 98.3 °F (36.8 °C), temperature source Oral, resp. rate 22, height 66\" (167.6 cm), weight 322 lb (146 kg), SpO2 97 %.    HEENT: (Tracheostomy in place)    Lungs:  Normal effort.  There are decreased breath sounds.    Heart: Normal rate.  Regular rhythm.    Abdomen: Abdomen is soft and non-distended.  (Morbidly obese)Bowel sounds are normal.   There is no abdominal tenderness.     Extremities: There is no dependent edema.    Neurological: Patient is alert and oriented to person, place and time.  (Nonverbal).    Skin:  Warm and dry.        Results Review:       I reviewed the patient's new clinical results.    Results from last 7 days  Lab Units 17  0600 17  0644 17  0232   WBC 10*3/mm3 6.89 7.47 8.35   HEMOGLOBIN g/dL 10.7* 11.6* 11.7*   PLATELETS 10*3/mm3 214 217 266       Results from last 7 days  Lab Units 17  0600 17  1540 17  0644 17  0232   SODIUM mmol/L 140  --  140 " 139   POTASSIUM mmol/L 4.3 4.4 4.8 4.5   CHLORIDE mmol/L 98  --  98 98   TOTAL CO2 mmol/L 25.8  --  28.1 28.0   BUN mg/dL 45*  --  53* 49*   CREATININE mg/dL 1.79*  --  2.28* 2.39*   GLUCOSE mg/dL 140*  --  131* 150*   Estimated Creatinine Clearance: 53.3 mL/min (by C-G formula based on Cr of 1.79).    Results from last 7 days  Lab Units 03/17/17  0600 03/16/17  1540 03/16/17  0644 03/16/17  0232   CALCIUM mg/dL 9.5  --  9.5 9.6   ALBUMIN g/dL  --   --   --  3.80   MAGNESIUM mg/dL  --  2.0  --   --        Results from last 7 days  Lab Units 03/16/17  0348   LACTATE mmol/L 1.4   No results found for: STREPPNEUAG, LEGANTIGENUR    Results from last 7 days  Lab Units 03/16/17  0419   BLOODCX  No growth at 24 hours     HEMOGLOBIN A1C   Date/Time Value Ref Range Status   03/16/2017 0232 4.90 4.80 - 5.60 % Final     GLUCOSE   Date/Time Value Ref Range Status   03/16/2017 2158 140 (H) 70 - 130 mg/dL Final   03/16/2017 1651 116 70 - 130 mg/dL Final   03/16/2017 1259 197 (H) 70 - 130 mg/dL Final           arformoterol 15 mcg Nebulization BID - RT   aspirin 81 mg Oral Daily   budesonide 0.5 mg Nebulization BID - RT   diltiaZEM 60 mg Per G Tube TID   enoxaparin 30 mg Subcutaneous Daily   famotidine 10 mg Per G Tube BID   guaiFENesin 600 mg Oral BID   insulin aspart 0-7 Units Subcutaneous 4x Daily AC & at Bedtime   insulin detemir 20 Units Subcutaneous BID   ipratropium-albuterol 3 mL Nebulization 4x Daily - RT   lanthanum 500 mg Oral TID With Meals   metoprolol tartrate 25 mg Per G Tube BID   piperacillin-tazobactam 3.375 g Intravenous Q8H   polyethylene glycol 17 g Per G Tube Daily   vancomycin 15 mg/kg Intravenous Q24H       Pharmacy to dose vancomycin    Diet Regular; Consistent Carbohydrate, Cardiac      Assessment/Plan   Assessment:     Active Hospital Problems (** Indicates Principal Problem)    Diagnosis Date Noted   • **Healthcare associated bacterial pneumonia - covering for gram-negative and MRSA organisms [J15.9]  03/17/2017   • Acute kidney injury [N17.9] 03/17/2017   • Morbid obesity with BMI of 50.0-59.9, adult [E66.01, Z68.43] 03/17/2017   • Acute on chronic respiratory failure with hypoxia [J96.21] 03/16/2017   • Tracheostomy in place [Z93.0] 03/16/2017   • Hepatitis C [B19.20] 03/16/2017   • Type 2 diabetes mellitus without complication [E11.9] 03/16/2017   • Sleep apnea, obstructive [G47.33] 03/16/2017   • Hypertension [I10] 03/16/2017   • Atrial fibrillation [I48.91] 03/16/2017   • Chronic diastolic congestive heart failure [I50.32] 03/16/2017      Resolved Hospital Problems    Diagnosis Date Noted Date Resolved   No resolved problems to display.       Plan:   - Admitted to monitored unit  - Continue ZOSYN and VANCOMYCIN covering for nosocomial exposures.  - Blood cultures x2 done in ED negative thus far.    - Sputum for gram stain and culture pending.  - MRSA screen pending.  - VRP negative.  - Supplemental oxygen to keep SaO2 > 92%  - Pulmonary consulted.  Help appreciated.  - GABI suspected.  Continue to monitor.  - Physical therapy consult        Tian Caiecdo MD  [facility]  03/17/17  8:50 AM

## 2017-03-17 NOTE — PROGRESS NOTES
Discharge Planning Assessment  [facility]     Patient Name: Lucy Sevilla  MRN: [id number]  Today's Date: 3/17/2017    Admit Date: 3/16/2017          Discharge Needs Assessment     None            Discharge Plan       03/17/17 1059    Case Management/Social Work Plan    Plan [facility] - IC level bed with a paid bedhold    Patient/Family In Agreement With Plan yes    Additional Comments Spoke with Aide/Dori.  Patient is from an IC level bed with a paid bedhold and [facility] can accept back at NH.  Transfer packet in cubbie.         Discharge Placement     Facility/Agency Request Status Selected? Address Phone Number Fax Number    [facility] Accepted    Yes [address] [facility] [address] [phone]                Demographic Summary     None            Functional Status     None            Psychosocial     None            Abuse/Neglect     None            Legal     None            Substance Abuse     None            Patient Forms     None          Kaleigh Mendoza RN

## 2017-03-18 LAB
ANION GAP SERPL CALCULATED.3IONS-SCNC: 13.8 MMOL/L
ARTERIAL PATENCY WRIST A: POSITIVE
ATMOSPHERIC PRESS: 753.9 MMHG
BASE EXCESS BLDA CALC-SCNC: 5.7 MMOL/L (ref 0–2)
BASOPHILS # BLD AUTO: 0.03 10*3/MM3 (ref 0–0.2)
BASOPHILS NFR BLD AUTO: 0.4 % (ref 0–1.5)
BDY SITE: ABNORMAL
BUN BLD-MCNC: 41 MG/DL (ref 6–20)
BUN/CREAT SERPL: 19.1 (ref 7–25)
CALCIUM SPEC-SCNC: 9 MG/DL (ref 8.6–10.5)
CHLORIDE SERPL-SCNC: 100 MMOL/L (ref 98–107)
CO2 SERPL-SCNC: 28.2 MMOL/L (ref 22–29)
CREAT BLD-MCNC: 2.15 MG/DL (ref 0.57–1)
DEPRECATED RDW RBC AUTO: 57.1 FL (ref 37–54)
EOSINOPHIL # BLD AUTO: 0.3 10*3/MM3 (ref 0–0.7)
EOSINOPHIL NFR BLD AUTO: 3.7 % (ref 0.3–6.2)
ERYTHROCYTE [DISTWIDTH] IN BLOOD BY AUTOMATED COUNT: 17.1 % (ref 11.7–13)
GFR SERPL CREATININE-BSD FRML MDRD: 24 ML/MIN/1.73
GLUCOSE BLD-MCNC: 147 MG/DL (ref 65–99)
GLUCOSE BLDC GLUCOMTR-MCNC: 138 MG/DL (ref 70–130)
GLUCOSE BLDC GLUCOMTR-MCNC: 143 MG/DL (ref 70–130)
GLUCOSE BLDC GLUCOMTR-MCNC: 175 MG/DL (ref 70–130)
GLUCOSE BLDC GLUCOMTR-MCNC: 186 MG/DL (ref 70–130)
HCO3 BLDA-SCNC: 32 MMOL/L (ref 22–28)
HCT VFR BLD AUTO: 32.1 % (ref 35.6–45.5)
HGB BLD-MCNC: 10.2 G/DL (ref 11.9–15.5)
HOROWITZ INDEX BLD+IHG-RTO: 30 %
IMM GRANULOCYTES # BLD: 0 10*3/MM3 (ref 0–0.03)
IMM GRANULOCYTES NFR BLD: 0 % (ref 0–0.5)
LYMPHOCYTES # BLD AUTO: 1.51 10*3/MM3 (ref 0.9–4.8)
LYMPHOCYTES NFR BLD AUTO: 18.4 % (ref 19.6–45.3)
MCH RBC QN AUTO: 29 PG (ref 26.9–32)
MCHC RBC AUTO-ENTMCNC: 31.8 G/DL (ref 32.4–36.3)
MCV RBC AUTO: 91.2 FL (ref 80.5–98.2)
MODALITY: ABNORMAL
MONOCYTES # BLD AUTO: 0.67 10*3/MM3 (ref 0.2–1.2)
MONOCYTES NFR BLD AUTO: 8.2 % (ref 5–12)
NEUTROPHILS # BLD AUTO: 5.69 10*3/MM3 (ref 1.9–8.1)
NEUTROPHILS NFR BLD AUTO: 69.3 % (ref 42.7–76)
O2 A-A PPRESDIFF RESPIRATORY: 0.7 MMHG
PCO2 BLDA: 51.6 MM HG (ref 35–45)
PH BLDA: 7.4 PH UNITS (ref 7.35–7.45)
PLATELET # BLD AUTO: 185 10*3/MM3 (ref 140–500)
PMV BLD AUTO: 9.5 FL (ref 6–12)
PO2 BLDA: 111.6 MM HG (ref 80–100)
POTASSIUM BLD-SCNC: 4 MMOL/L (ref 3.5–5.2)
RBC # BLD AUTO: 3.52 10*6/MM3 (ref 3.9–5.2)
SAO2 % BLDCOA: 98.3 % (ref 92–99)
SODIUM BLD-SCNC: 142 MMOL/L (ref 136–145)
TOTAL RATE: 20 BREATHS/MINUTE
WBC NRBC COR # BLD: 8.2 10*3/MM3 (ref 4.5–10.7)

## 2017-03-18 PROCEDURE — 25010000002 ENOXAPARIN PER 10 MG: Performed by: HOSPITALIST

## 2017-03-18 PROCEDURE — 36600 WITHDRAWAL OF ARTERIAL BLOOD: CPT

## 2017-03-18 PROCEDURE — 80048 BASIC METABOLIC PNL TOTAL CA: CPT | Performed by: HOSPITALIST

## 2017-03-18 PROCEDURE — 25010000002 PIPERACILLIN SOD-TAZOBACTAM PER 1 G: Performed by: HOSPITALIST

## 2017-03-18 PROCEDURE — 99231 SBSQ HOSP IP/OBS SF/LOW 25: CPT | Performed by: PSYCHIATRY & NEUROLOGY

## 2017-03-18 PROCEDURE — 82962 GLUCOSE BLOOD TEST: CPT

## 2017-03-18 PROCEDURE — 94799 UNLISTED PULMONARY SVC/PX: CPT

## 2017-03-18 PROCEDURE — 63710000001 INSULIN ASPART PER 5 UNITS: Performed by: HOSPITALIST

## 2017-03-18 PROCEDURE — 25010000002 VANCOMYCIN: Performed by: HOSPITALIST

## 2017-03-18 PROCEDURE — 85025 COMPLETE CBC W/AUTO DIFF WBC: CPT | Performed by: HOSPITALIST

## 2017-03-18 PROCEDURE — 82803 BLOOD GASES ANY COMBINATION: CPT

## 2017-03-18 RX ADMIN — CARBIDOPA AND LEVODOPA 1 TABLET: 25; 100 TABLET ORAL at 19:05

## 2017-03-18 RX ADMIN — BUDESONIDE 0.5 MG: 0.5 INHALANT RESPIRATORY (INHALATION) at 23:18

## 2017-03-18 RX ADMIN — ASPIRIN 81 MG: 81 TABLET ORAL at 10:27

## 2017-03-18 RX ADMIN — LANTHANUM CARBONATE 500 MG: 500 TABLET, CHEWABLE ORAL at 19:05

## 2017-03-18 RX ADMIN — TAZOBACTAM SODIUM AND PIPERACILLIN SODIUM 3.38 G: 375; 3 INJECTION, SOLUTION INTRAVENOUS at 10:26

## 2017-03-18 RX ADMIN — FAMOTIDINE 10 MG: 20 TABLET, FILM COATED ORAL at 19:05

## 2017-03-18 RX ADMIN — ARFORMOTEROL TARTRATE 15 MCG: 15 SOLUTION RESPIRATORY (INHALATION) at 09:27

## 2017-03-18 RX ADMIN — LANTHANUM CARBONATE 500 MG: 500 TABLET, CHEWABLE ORAL at 10:27

## 2017-03-18 RX ADMIN — FAMOTIDINE 10 MG: 20 TABLET, FILM COATED ORAL at 10:27

## 2017-03-18 RX ADMIN — IPRATROPIUM BROMIDE AND ALBUTEROL SULFATE 3 ML: .5; 3 SOLUTION RESPIRATORY (INHALATION) at 16:12

## 2017-03-18 RX ADMIN — VANCOMYCIN HYDROCHLORIDE 2250 MG: 1 INJECTION, POWDER, LYOPHILIZED, FOR SOLUTION INTRAVENOUS at 15:16

## 2017-03-18 RX ADMIN — INSULIN DETEMIR 20 UNITS: 100 INJECTION, SOLUTION SUBCUTANEOUS at 19:08

## 2017-03-18 RX ADMIN — PRIMIDONE 50 MG: 50 TABLET ORAL at 19:05

## 2017-03-18 RX ADMIN — DILTIAZEM HYDROCHLORIDE 60 MG: 60 TABLET, FILM COATED ORAL at 10:27

## 2017-03-18 RX ADMIN — LANTHANUM CARBONATE 500 MG: 500 TABLET, CHEWABLE ORAL at 12:30

## 2017-03-18 RX ADMIN — METOPROLOL TARTRATE 25 MG: 25 TABLET ORAL at 10:27

## 2017-03-18 RX ADMIN — GUAIFENESIN 600 MG: 600 TABLET, EXTENDED RELEASE ORAL at 10:27

## 2017-03-18 RX ADMIN — ARFORMOTEROL TARTRATE 15 MCG: 15 SOLUTION RESPIRATORY (INHALATION) at 23:18

## 2017-03-18 RX ADMIN — INSULIN DETEMIR 20 UNITS: 100 INJECTION, SOLUTION SUBCUTANEOUS at 10:38

## 2017-03-18 RX ADMIN — INSULIN ASPART 2 UNITS: 100 INJECTION, SOLUTION INTRAVENOUS; SUBCUTANEOUS at 22:34

## 2017-03-18 RX ADMIN — IPRATROPIUM BROMIDE AND ALBUTEROL SULFATE 3 ML: .5; 3 SOLUTION RESPIRATORY (INHALATION) at 21:18

## 2017-03-18 RX ADMIN — METOPROLOL TARTRATE 25 MG: 25 TABLET ORAL at 19:05

## 2017-03-18 RX ADMIN — GUAIFENESIN 600 MG: 600 TABLET, EXTENDED RELEASE ORAL at 19:05

## 2017-03-18 RX ADMIN — TAZOBACTAM SODIUM AND PIPERACILLIN SODIUM 3.38 G: 375; 3 INJECTION, SOLUTION INTRAVENOUS at 21:35

## 2017-03-18 RX ADMIN — ENOXAPARIN SODIUM 30 MG: 30 INJECTION SUBCUTANEOUS at 10:38

## 2017-03-18 RX ADMIN — IPRATROPIUM BROMIDE AND ALBUTEROL SULFATE 3 ML: .5; 3 SOLUTION RESPIRATORY (INHALATION) at 07:10

## 2017-03-18 RX ADMIN — DILTIAZEM HYDROCHLORIDE 60 MG: 60 TABLET, FILM COATED ORAL at 19:05

## 2017-03-18 RX ADMIN — PRIMIDONE 50 MG: 50 TABLET ORAL at 10:27

## 2017-03-18 RX ADMIN — BUDESONIDE 0.5 MG: 0.5 INHALANT RESPIRATORY (INHALATION) at 09:27

## 2017-03-18 NOTE — PLAN OF CARE
Problem: Patient Care Overview (Adult)  Goal: Plan of Care Review  Outcome: Ongoing (interventions implemented as appropriate)    03/18/17 0426   Coping/Psychosocial Response Interventions   Plan Of Care Reviewed With patient   Patient Care Overview   Progress progress towards functional goals is fair   Outcome Evaluation   Outcome Summary/Follow up Plan Doing well overnight. Slept well with trach collar. Does not use speaking valve to communicate. Suctioned x 2. VSS. Continue to monitor.        Goal: Discharge Needs Assessment  Outcome: Ongoing (interventions implemented as appropriate)    Problem: Respiratory Insufficiency (Adult)  Goal: Acid/Base Balance  Outcome: Ongoing (interventions implemented as appropriate)  Goal: Effective Ventilation  Outcome: Ongoing (interventions implemented as appropriate)    Problem: Skin Integrity Impairment, Risk/Actual (Adult)  Goal: Skin Integrity/Wound Healing  Outcome: Ongoing (interventions implemented as appropriate)    Problem: Fall Risk (Adult)  Goal: Identify Related Risk Factors and Signs and Symptoms  Outcome: Ongoing (interventions implemented as appropriate)  Goal: Absence of Falls  Outcome: Ongoing (interventions implemented as appropriate)

## 2017-03-18 NOTE — PROGRESS NOTES
[facility]  Phone: [phone]  Clinton Soares MD    Subjective:  LOS: 2    She does not appear to be in any distress.  She is awake and answers my questions by nodding her head.  Not very interactive.  According to the nurse she prefers to sleep most of the time.    Objective   Vital Signs past 24hrs  BP range: BP: (124-168)/() 124/81  Pulse range: Heart Rate:  [76-92] 86  Resp rate range: Resp:  [18-22] 20  Temp range: Temp (24hrs), Av °F (36.7 °C), Min:97.8 °F (36.6 °C), Max:98.2 °F (36.8 °C)    O2 Device: tracheostomy collarFlow (L/min):  [8] 8  Oxygen range:SpO2:  [96 %-100 %] 99 %   (!) 322 lb (146 kg); Body mass index is 51.97 kg/(m^2).  No intake or output data in the 24 hours ending 17 1459    Physical Exam   Constitutional: She appears well-developed.   Eyes: Pupils are equal, round, and reactive to light.   Cardiovascular: Normal rate and regular rhythm.    No murmur heard.  Pulmonary/Chest: She has decreased breath sounds (poor effort). She has no wheezes. She has no rhonchi. She has no rales.   Trach in place   Abdominal: Soft. Bowel sounds are normal. She exhibits no mass. There is no tenderness.   Obese  PEG noted   Musculoskeletal: She exhibits no edema.   Neurological: She is alert.     Results Review:    I have reviewed the laboratory and imaging data since the last note by [facility] physician.  My annotations are noted in assessment and plan.    Medication Review:  I have reviewed the current MAR.  My annotations are noted in assessment and plan.      Pharmacy to dose vancomycin      Plan   PCCM Problems  Chronic respiratory failure with tracheostomy  Hypoxic respiratory failure on supplemental oxygen  Possible pneumonia on antibiotics  Morbid obesity  Relevant Medical Diagnoses  Diabetes mellitus  Chronic kidney disease  Atrial fibrillation  Chronic diastolic heart failure      Plan of Treatment  Given recent tremors I will check an ABG.    Check pro-calcitonin.  Initial  chest x-ray was not very impressive.  However very limited views due to morbid obesity.    Continue supplemental oxygen and wean as tolerated.    Encourage mobilization.    Clinton Soares MD  03/18/17  2:59 PM    EMR Dragon/Transcription disclaimer:   Some of this note may be an electronic transcription/translation of spoken language to printed text. The electronic translation of spoken language may permit erroneous, or at times, nonsensical words or phrases to be inadvertently transcribed; Although I have reviewed the note for such errors, some may still exist.

## 2017-03-18 NOTE — PROGRESS NOTES
"      Name: Lucy Sevilla ADMIT: 3/16/2017   : 1960  PCP: Eloy Cancino MD    MRN: [id number] LOS: 2 days   AGE/SEX: 56 y.o. female  ROOM: [facility]     Subjective   Continues to slowly improve.  Complains of numbness in right foot has been present for several weeks.    Objective   Vital Signs  Temp:  [97.8 °F (36.6 °C)-98.2 °F (36.8 °C)] 98 °F (36.7 °C)  Heart Rate:  [67-92] 80  Resp:  [18-22] 18  BP: (113-168)/() 124/81  SpO2:  [90 %-100 %] 96 %  on  Flow (L/min):  [8] 8;   O2 Device: tracheostomy collar  Body mass index is 51.97 kg/(m^2).    Intake/Output Summary (Last 24 hours) at 17 1047  Last data filed at 17 1440   Gross per 24 hour   Intake    925 ml   Output      0 ml   Net    925 ml     Wt Readings from Last 1 Encounters:   17 0634 (!) 322 lb (146 kg)   17 0159 (!) 374 lb (170 kg)     Wt Readings from Last 3 Encounters:   17 (!) 322 lb (146 kg)       Objective:  General Appearance:  Comfortable, in no acute distress and ill-appearing.    Vital signs: (most recent): Blood pressure 124/81, pulse 80, temperature 98 °F (36.7 °C), temperature source Oral, resp. rate 18, height 66\" (167.6 cm), weight 322 lb (146 kg), SpO2 96 %.    HEENT: (Tracheostomy in place)    Lungs:  Normal effort.  There are decreased breath sounds.    Heart: Normal rate.  Regular rhythm.    Abdomen: Abdomen is soft and non-distended.  (Morbidly obese)Bowel sounds are normal.   There is no abdominal tenderness.     Extremities: There is no dependent edema.    Neurological: Patient is alert and oriented to person, place and time.  (Nonverbal).    Skin:  Warm and dry.          Results Review:       I reviewed the patient's new clinical results.    Results from last 7 days  Lab Units 17  0443 17  0600 17  0644 17  0232   WBC 10*3/mm3 8.20 6.89 7.47 8.35   HEMOGLOBIN g/dL 10.2* 10.7* 11.6* 11.7*   PLATELETS 10*3/mm3 185 214 217 266       Results from last 7 days  Lab Units " 03/18/17  0443 03/17/17  0600 03/16/17  1540 03/16/17  0644 03/16/17  0232   SODIUM mmol/L 142 140  --  140 139   POTASSIUM mmol/L 4.0 4.3 4.4 4.8 4.5   CHLORIDE mmol/L 100 98  --  98 98   TOTAL CO2 mmol/L 28.2 25.8  --  28.1 28.0   BUN mg/dL 41* 45*  --  53* 49*   CREATININE mg/dL 2.15* 1.79*  --  2.28* 2.39*   GLUCOSE mg/dL 147* 140*  --  131* 150*   Estimated Creatinine Clearance: 44.4 mL/min (by C-G formula based on Cr of 2.15).    Results from last 7 days  Lab Units 03/18/17  0443 03/17/17  0600 03/16/17  1540 03/16/17  0644 03/16/17  0232   CALCIUM mg/dL 9.0 9.5  --  9.5 9.6   ALBUMIN g/dL  --   --   --   --  3.80   MAGNESIUM mg/dL  --   --  2.0  --   --        Results from last 7 days  Lab Units 03/16/17  0348   LACTATE mmol/L 1.4   No results found for: STREPPNEUAG, LEGANTIGENUR    Results from last 7 days  Lab Units 03/17/17  0910 03/16/17  0419   BLOODCX   --  No growth at 2 days   GRAM STAIN RESULT  Occasional WBCs seen  Occasional Mixed bacterial morphotypes seen on Gram Stain  --      HEMOGLOBIN A1C   Date/Time Value Ref Range Status   03/16/2017 0232 4.90 4.80 - 5.60 % Final     GLUCOSE   Date/Time Value Ref Range Status   03/18/2017 0757 138 (H) 70 - 130 mg/dL Final   03/17/2017 2020 179 (H) 70 - 130 mg/dL Final   03/17/2017 1709 186 (H) 70 - 130 mg/dL Final   03/17/2017 1111 180 (H) 70 - 130 mg/dL Final   03/16/2017 2158 140 (H) 70 - 130 mg/dL Final   03/16/2017 1651 116 70 - 130 mg/dL Final   03/16/2017 1259 197 (H) 70 - 130 mg/dL Final           arformoterol 15 mcg Nebulization BID - RT   aspirin 81 mg Oral Daily   budesonide 0.5 mg Nebulization BID - RT   diltiaZEM 60 mg Per G Tube TID   enoxaparin 30 mg Subcutaneous Daily   famotidine 10 mg Per G Tube BID   guaiFENesin 600 mg Oral BID   insulin aspart 0-7 Units Subcutaneous 4x Daily AC & at Bedtime   insulin detemir 20 Units Subcutaneous BID   ipratropium-albuterol 3 mL Nebulization 4x Daily - RT   lanthanum 500 mg Oral TID With Meals    metoprolol tartrate 25 mg Per G Tube BID   piperacillin-tazobactam 3.375 g Intravenous Q8H   polyethylene glycol 17 g Per G Tube Daily   primidone 50 mg Oral BID   vancomycin 15 mg/kg Intravenous Q24H       Pharmacy to dose vancomycin    Diet Regular; Consistent Carbohydrate, Cardiac      Assessment/Plan   Assessment:     Active Hospital Problems (** Indicates Principal Problem)    Diagnosis Date Noted   • **Healthcare associated bacterial pneumonia - covering for gram-negative and MRSA organisms [J15.9] 03/17/2017   • Acute kidney injury [N17.9] 03/17/2017   • Morbid obesity with BMI of 50.0-59.9, adult [E66.01, Z68.43] 03/17/2017   • Anoxic encephalopathy [G93.1] 03/17/2017   • Essential tremor [G25.0] 03/17/2017   • Acute on chronic respiratory failure with hypoxia [J96.21] 03/16/2017   • Tracheostomy in place [Z93.0] 03/16/2017   • Hepatitis C [B19.20] 03/16/2017   • Type 2 diabetes mellitus without complication [E11.9] 03/16/2017   • Sleep apnea, obstructive [G47.33] 03/16/2017   • Hypertension [I10] 03/16/2017   • Atrial fibrillation [I48.91] 03/16/2017   • Chronic diastolic congestive heart failure [I50.32] 03/16/2017      Resolved Hospital Problems    Diagnosis Date Noted Date Resolved   No resolved problems to display.       Plan:   - Admitted to monitored unit  - Continue ZOSYN and VANCOMYCIN covering for nosocomial exposures.  - Blood cultures x2 done in ED negative thus far.    - Sputum for gram stain and culture pending.  - MRSA screen pending.  - VRP negative.  - Supplemental oxygen to keep SaO2 > 92%  - Pulmonary consulted.  Help appreciated.  - GABI suspected.  Continue to monitor.  This may be her baseline renal function.  - Neurology following for tremor.  Mysoline started.  - Physical therapy consult        Tian Caicedo MD  [facility]  03/18/17  10:47 AM

## 2017-03-18 NOTE — PROGRESS NOTES
Patient Identification:  NAME:  Lucy Sevilla  Age:  56 y.o.   Sex:  female   :  1960   MRN:  [id number]       Chief complaint: Metabolic encephalopathy, parkinsonism    History of present illness:  She is much more awake and alert today smiling broadly but still has significant rigidity and bradykinesia she is floating hands and moves very slowly.  She states that she has never been diagnosed with Parkinson's disease or any parkinsonian syndrome previously.      Past medical history:  Past Medical History   Diagnosis Date   • Anemia    • Atrial fibrillation    • CHF (congestive heart failure)    • Diabetes mellitus    • Hepatitis C    • Hyperlipidemia    • Hypertension    • Renal disorder    • Sleep apnea, obstructive    • Tracheostomy in place        Allergies:  Codeine    Home medications:  Prescriptions Prior to Admission   Medication Sig Dispense Refill Last Dose   • acetaminophen (TYLENOL) 325 MG tablet 650 mg by Per G Tube route Every 6 (Six) Hours As Needed for Mild Pain (1-3).      • albuterol (ACCUNEB) 0.63 MG/3ML nebulizer solution Take 1 ampule by nebulization Every 4 (Four) Hours As Needed for Wheezing.      • albuterol (ACCUNEB) 0.63 MG/3ML nebulizer solution Take 1 ampule by nebulization Every 6 (Six) Hours. 1 dose via trach every six hours related to chronic obstructive pulmonary disease      • arformoterol (BROVANA) 15 MCG/2ML nebulizer solution Take  by nebulization 2 (Two) Times a Day. One dose via trach every twelve hours      • aspirin 81 MG EC tablet 81 mg by Per G Tube route Daily.      • B Complex-C-Folic Acid (JOY-MARLENY PO) 1 tablet by Per G Tube route Daily.      • budesonide (PULMICORT) 0.5 MG/2ML nebulizer solution Take 0.5 mg by nebulization 2 (Two) Times a Day. 1 puff inhale orally two times a day      • diltiaZEM (CARDIZEM) 60 MG tablet 60 mg by Per G Tube route 3 (Three) Times a Day.      • enoxaparin (LOVENOX) 40 MG/0.4ML solution syringe Inject 40 mg under the skin Daily.       • famotidine (PEPCID) 20 MG tablet 20 mg by Per G Tube route 2 (Two) Times a Day.      • furosemide (LASIX) 20 MG tablet 20 mg by Per G Tube route Daily.      • haloperidol (HALDOL) 5 MG tablet 2.5 mg by Per G Tube route 2 (Two) Times a Day As Needed for Agitation. Give 1/2 tablet every twelve hours as needed for agitation      • insulin detemir (LEVEMIR) 100 UNIT/ML injection Inject 20 Units under the skin 2 (Two) Times a Day.      • insulin NPH-insulin regular (NOVOLIN 70/30) (70-30) 100 UNIT/ML injection Inject  under the skin 2 (Two) Times a Day With Meals. Sliding scale      • ipratropium-albuterol (COMBIVENT)  MCG/ACT inhaler Inhale 1 puff Every 6 (Six) Hours As Needed for Wheezing. 1 dose via trach every six hours as needed for SOA      • Lanthanum Carbonate 750 MG pack 750 mg by Per G Tube route Every 8 (Eight) Hours.      • levoFLOXacin (LEVAQUIN) 750 MG tablet 750 mg by Per G Tube route Daily.      • metoprolol tartrate (LOPRESSOR) 25 MG tablet 25 mg by Per G Tube route 2 (Two) Times a Day.      • ondansetron (ZOFRAN) 4 MG tablet 4 mg by Per G Tube route Every 4 (Four) Hours As Needed for Nausea or Vomiting.      • polyethylene glycol (MIRALAX) packet 17 g by Per G Tube route Daily.      • traMADol (ULTRAM) 50 MG tablet 50 mg by Per G Tube route Every 6 (Six) Hours As Needed for Moderate Pain (4-6).      • tuberculin (TUBERSOL) 5 UNIT/0.1ML injection Inject 5 Units into the skin Every Night.           Hospital medications:    arformoterol 15 mcg Nebulization BID - RT   aspirin 81 mg Oral Daily   budesonide 0.5 mg Nebulization BID - RT   diltiaZEM 60 mg Per G Tube TID   enoxaparin 30 mg Subcutaneous Daily   famotidine 10 mg Per G Tube BID   guaiFENesin 600 mg Oral BID   insulin aspart 0-7 Units Subcutaneous 4x Daily AC & at Bedtime   insulin detemir 20 Units Subcutaneous BID   ipratropium-albuterol 3 mL Nebulization 4x Daily - RT   lanthanum 500 mg Oral TID With Meals   metoprolol tartrate 25 mg  Per G Tube BID   piperacillin-tazobactam 3.375 g Intravenous Q8H   polyethylene glycol 17 g Per G Tube Daily   primidone 50 mg Oral BID   vancomycin 15 mg/kg Intravenous Q24H       Pharmacy to dose vancomycin      •  acetaminophen  •  dextrose  •  dextrose  •  glucagon (human recombinant)  •  haloperidol  •  ondansetron  •  Pharmacy to dose vancomycin  •  sodium chloride  •  Insert peripheral IV **AND** sodium chloride  •  traMADol      Objective:  Vitals Ranges:   Temp:  [97.8 °F (36.6 °C)-98.2 °F (36.8 °C)] 98 °F (36.7 °C)  Heart Rate:  [76-92] 86  Resp:  [18-22] 20  BP: (124-168)/() 124/81      Physical Exam:  Awake alert whispers things to me is very rigid in all extremities has floating hands mask face ease no definite resting tremor but some superimposed tremor in the upper extremities    Results review:   I reviewed the patient's new clinical results.    Data review:  Lab Results (last 24 hours)     Procedure Component Value Units Date/Time    POC Glucose Fingerstick [[id number]]  (Abnormal) Collected:  03/17/17 1709    Specimen:  Blood Updated:  03/17/17 1712     Glucose 186 (H) mg/dL     Narrative:       Meter: [id number] : [id number] Chau Goldstein    POC Glucose Fingerstick [[id number]]  (Abnormal) Collected:  03/17/17 2020    Specimen:  Blood Updated:  03/17/17 2021     Glucose 179 (H) mg/dL     Narrative:       Meter: [id number] : [id number] Juwan Palma    Blood Culture [[id number]]  (Normal) Collected:  03/16/17 0348    Specimen:  Blood from Arm, Left Updated:  03/18/17 0401     Blood Culture No growth at 2 days     Blood Culture [[id number]]  (Normal) Collected:  03/16/17 0419    Specimen:  Blood from Arm, Right Updated:  03/18/17 0501     Blood Culture No growth at 2 days     CBC & Differential [[id number]] Collected:  03/18/17 0443    Specimen:  Blood Updated:  03/18/17 0511    Narrative:       The following orders were created for panel order CBC & Differential.  Procedure                                Abnormality         Status                     ---------                               -----------         ------                     CBC Auto Differential[[id number]]         Abnormal            Final result                 Please view results for these tests on the individual orders.    CBC Auto Differential [[id number]]  (Abnormal) Collected:  03/18/17 0443    Specimen:  Blood Updated:  03/18/17 0511     WBC 8.20 10*3/mm3      RBC 3.52 (L) 10*6/mm3      Hemoglobin 10.2 (L) g/dL      Hematocrit 32.1 (L) %      MCV 91.2 fL      MCH 29.0 pg      MCHC 31.8 (L) g/dL      RDW 17.1 (H) %      RDW-SD 57.1 (H) fl      MPV 9.5 fL      Platelets 185 10*3/mm3      Neutrophil % 69.3 %      Lymphocyte % 18.4 (L) %      Monocyte % 8.2 %      Eosinophil % 3.7 %      Basophil % 0.4 %      Immature Grans % 0.0 %      Neutrophils, Absolute 5.69 10*3/mm3      Lymphocytes, Absolute 1.51 10*3/mm3      Monocytes, Absolute 0.67 10*3/mm3      Eosinophils, Absolute 0.30 10*3/mm3      Basophils, Absolute 0.03 10*3/mm3      Immature Grans, Absolute 0.00 10*3/mm3     Basic Metabolic Panel [[id number]]  (Abnormal) Collected:  03/18/17 0443    Specimen:  Blood Updated:  03/18/17 0603     Glucose 147 (H) mg/dL      BUN 41 (H) mg/dL      Creatinine 2.15 (H) mg/dL      Sodium 142 mmol/L      Potassium 4.0 mmol/L      Chloride 100 mmol/L      CO2 28.2 mmol/L      Calcium 9.0 mg/dL      eGFR Non African Amer 24 (L) mL/min/1.73      BUN/Creatinine Ratio 19.1      Anion Gap 13.8 mmol/L     Narrative:       GFR Normal >60  Chronic Kidney Disease <60  Kidney Failure <15    POC Glucose Fingerstick [[id number]]  (Abnormal) Collected:  03/18/17 0757    Specimen:  Blood Updated:  03/18/17 0759     Glucose 138 (H) mg/dL     Narrative:       Meter: [id number] : [id number] Knapp [facility]    Respiratory Culture [[id number]]  (Abnormal) Collected:  03/17/17 0910    Specimen:  Sputum from ET Suction Updated:  03/18/17 1130     Respiratory Culture Scant  growth (1+) Gram Positive Rods (A)       Rare Normal Respiratory Bella      Gram Stain Result Occasional WBCs seen              Occasional Mixed bacterial morphotypes seen on Gram Stain    POC Glucose Fingerstick [[id number]]  (Abnormal) Collected:  03/18/17 1209    Specimen:  Blood Updated:  03/18/17 1211     Glucose 175 (H) mg/dL     Narrative:       Meter: [id number] : [id number] Aria [employer]           Imaging:  Imaging Results (last 24 hours)     ** No results found for the last 24 hours. **             Assessment and Plan:     Principal Problem:    Healthcare associated bacterial pneumonia - covering for gram-negative and MRSA organisms  Active Problems:    Acute on chronic respiratory failure with hypoxia    Tracheostomy in place    Hepatitis C    Type 2 diabetes mellitus without complication    Sleep apnea, obstructive    Hypertension    Atrial fibrillation    Chronic diastolic congestive heart failure    Acute kidney injury    Morbid obesity with BMI of 50.0-59.9, adult    Anoxic encephalopathy    Essential tremor   the patient is much more awake and alert today her metabolic encephalopathy appears to be better.  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  She has significant bradykinesia and rigidity and we should be able to tell if the Sinemet is making any difference or not.      Micha Cortés MD  03/18/17  2:46 PM

## 2017-03-19 LAB
ANION GAP SERPL CALCULATED.3IONS-SCNC: 13.5 MMOL/L
BACTERIA SPEC RESP CULT: NORMAL
BASOPHILS # BLD AUTO: 0.03 10*3/MM3 (ref 0–0.2)
BASOPHILS NFR BLD AUTO: 0.5 % (ref 0–1.5)
BUN BLD-MCNC: 35 MG/DL (ref 6–20)
BUN/CREAT SERPL: 24.5 (ref 7–25)
CALCIUM SPEC-SCNC: 9.2 MG/DL (ref 8.6–10.5)
CHLORIDE SERPL-SCNC: 100 MMOL/L (ref 98–107)
CO2 SERPL-SCNC: 25.5 MMOL/L (ref 22–29)
CREAT BLD-MCNC: 1.43 MG/DL (ref 0.57–1)
DEPRECATED RDW RBC AUTO: 56.4 FL (ref 37–54)
EOSINOPHIL # BLD AUTO: 0.34 10*3/MM3 (ref 0–0.7)
EOSINOPHIL NFR BLD AUTO: 5.6 % (ref 0.3–6.2)
ERYTHROCYTE [DISTWIDTH] IN BLOOD BY AUTOMATED COUNT: 17.3 % (ref 11.7–13)
GFR SERPL CREATININE-BSD FRML MDRD: 38 ML/MIN/1.73
GLUCOSE BLD-MCNC: 137 MG/DL (ref 65–99)
GLUCOSE BLDC GLUCOMTR-MCNC: 128 MG/DL (ref 70–130)
GLUCOSE BLDC GLUCOMTR-MCNC: 132 MG/DL (ref 70–130)
GLUCOSE BLDC GLUCOMTR-MCNC: 170 MG/DL (ref 70–130)
GLUCOSE BLDC GLUCOMTR-MCNC: 186 MG/DL (ref 70–130)
GRAM STN SPEC: NORMAL
GRAM STN SPEC: NORMAL
HCT VFR BLD AUTO: 33.3 % (ref 35.6–45.5)
HGB BLD-MCNC: 10.5 G/DL (ref 11.9–15.5)
IMM GRANULOCYTES # BLD: 0.02 10*3/MM3 (ref 0–0.03)
IMM GRANULOCYTES NFR BLD: 0.3 % (ref 0–0.5)
LYMPHOCYTES # BLD AUTO: 1.27 10*3/MM3 (ref 0.9–4.8)
LYMPHOCYTES NFR BLD AUTO: 21 % (ref 19.6–45.3)
MCH RBC QN AUTO: 28 PG (ref 26.9–32)
MCHC RBC AUTO-ENTMCNC: 31.5 G/DL (ref 32.4–36.3)
MCV RBC AUTO: 88.8 FL (ref 80.5–98.2)
MONOCYTES # BLD AUTO: 0.38 10*3/MM3 (ref 0.2–1.2)
MONOCYTES NFR BLD AUTO: 6.3 % (ref 5–12)
NEUTROPHILS # BLD AUTO: 4.01 10*3/MM3 (ref 1.9–8.1)
NEUTROPHILS NFR BLD AUTO: 66.3 % (ref 42.7–76)
PLATELET # BLD AUTO: 191 10*3/MM3 (ref 140–500)
PMV BLD AUTO: 9.1 FL (ref 6–12)
POTASSIUM BLD-SCNC: 4.4 MMOL/L (ref 3.5–5.2)
PROCALCITONIN SERPL-MCNC: 0.94 NG/ML (ref 0.1–0.25)
RBC # BLD AUTO: 3.75 10*6/MM3 (ref 3.9–5.2)
SODIUM BLD-SCNC: 139 MMOL/L (ref 136–145)
VANCOMYCIN TROUGH SERPL-MCNC: 21.6 MCG/ML (ref 5–20)
WBC NRBC COR # BLD: 6.05 10*3/MM3 (ref 4.5–10.7)

## 2017-03-19 PROCEDURE — 84145 PROCALCITONIN (PCT): CPT | Performed by: INTERNAL MEDICINE

## 2017-03-19 PROCEDURE — 25010000002 METHYLPREDNISOLONE PER 125 MG: Performed by: INTERNAL MEDICINE

## 2017-03-19 PROCEDURE — 25010000002 VANCOMYCIN: Performed by: HOSPITALIST

## 2017-03-19 PROCEDURE — 25010000002 ENOXAPARIN PER 10 MG: Performed by: HOSPITALIST

## 2017-03-19 PROCEDURE — 85025 COMPLETE CBC W/AUTO DIFF WBC: CPT | Performed by: HOSPITALIST

## 2017-03-19 PROCEDURE — 25010000002 PIPERACILLIN SOD-TAZOBACTAM PER 1 G: Performed by: HOSPITALIST

## 2017-03-19 PROCEDURE — 82962 GLUCOSE BLOOD TEST: CPT

## 2017-03-19 PROCEDURE — 80202 ASSAY OF VANCOMYCIN: CPT | Performed by: HOSPITALIST

## 2017-03-19 PROCEDURE — 63710000001 INSULIN ASPART PER 5 UNITS: Performed by: HOSPITALIST

## 2017-03-19 PROCEDURE — 94799 UNLISTED PULMONARY SVC/PX: CPT

## 2017-03-19 PROCEDURE — 99231 SBSQ HOSP IP/OBS SF/LOW 25: CPT | Performed by: PSYCHIATRY & NEUROLOGY

## 2017-03-19 PROCEDURE — 80048 BASIC METABOLIC PNL TOTAL CA: CPT | Performed by: HOSPITALIST

## 2017-03-19 RX ORDER — IPRATROPIUM BROMIDE AND ALBUTEROL SULFATE 2.5; .5 MG/3ML; MG/3ML
3 SOLUTION RESPIRATORY (INHALATION)
Status: DISCONTINUED | OUTPATIENT
Start: 2017-03-19 | End: 2017-03-22 | Stop reason: HOSPADM

## 2017-03-19 RX ORDER — METHYLPREDNISOLONE SODIUM SUCCINATE 125 MG/2ML
60 INJECTION, POWDER, LYOPHILIZED, FOR SOLUTION INTRAMUSCULAR; INTRAVENOUS ONCE
Status: COMPLETED | OUTPATIENT
Start: 2017-03-19 | End: 2017-03-19

## 2017-03-19 RX ORDER — IPRATROPIUM BROMIDE AND ALBUTEROL SULFATE 2.5; .5 MG/3ML; MG/3ML
3 SOLUTION RESPIRATORY (INHALATION)
Status: DISPENSED | OUTPATIENT
Start: 2017-03-19 | End: 2017-03-20

## 2017-03-19 RX ADMIN — INSULIN ASPART 2 UNITS: 100 INJECTION, SOLUTION INTRAVENOUS; SUBCUTANEOUS at 20:42

## 2017-03-19 RX ADMIN — CARBIDOPA AND LEVODOPA 1 TABLET: 25; 100 TABLET ORAL at 05:49

## 2017-03-19 RX ADMIN — LANTHANUM CARBONATE 500 MG: 500 TABLET, CHEWABLE ORAL at 19:05

## 2017-03-19 RX ADMIN — TAZOBACTAM SODIUM AND PIPERACILLIN SODIUM 3.38 G: 375; 3 INJECTION, SOLUTION INTRAVENOUS at 16:47

## 2017-03-19 RX ADMIN — FAMOTIDINE 10 MG: 20 TABLET, FILM COATED ORAL at 19:05

## 2017-03-19 RX ADMIN — BUDESONIDE 0.5 MG: 0.5 INHALANT RESPIRATORY (INHALATION) at 19:25

## 2017-03-19 RX ADMIN — DILTIAZEM HYDROCHLORIDE 60 MG: 60 TABLET, FILM COATED ORAL at 00:12

## 2017-03-19 RX ADMIN — PRIMIDONE 50 MG: 50 TABLET ORAL at 19:05

## 2017-03-19 RX ADMIN — ARFORMOTEROL TARTRATE 15 MCG: 15 SOLUTION RESPIRATORY (INHALATION) at 19:25

## 2017-03-19 RX ADMIN — CARBIDOPA AND LEVODOPA 1 TABLET: 25; 100 TABLET ORAL at 00:12

## 2017-03-19 RX ADMIN — ENOXAPARIN SODIUM 30 MG: 30 INJECTION SUBCUTANEOUS at 10:08

## 2017-03-19 RX ADMIN — INSULIN DETEMIR 20 UNITS: 100 INJECTION, SOLUTION SUBCUTANEOUS at 10:09

## 2017-03-19 RX ADMIN — CARBIDOPA AND LEVODOPA 1 TABLET: 25; 100 TABLET ORAL at 13:30

## 2017-03-19 RX ADMIN — INSULIN DETEMIR 20 UNITS: 100 INJECTION, SOLUTION SUBCUTANEOUS at 19:06

## 2017-03-19 RX ADMIN — IPRATROPIUM BROMIDE AND ALBUTEROL SULFATE 3 ML: .5; 3 SOLUTION RESPIRATORY (INHALATION) at 23:10

## 2017-03-19 RX ADMIN — IPRATROPIUM BROMIDE AND ALBUTEROL SULFATE 3 ML: .5; 3 SOLUTION RESPIRATORY (INHALATION) at 10:35

## 2017-03-19 RX ADMIN — DILTIAZEM HYDROCHLORIDE 60 MG: 60 TABLET, FILM COATED ORAL at 10:08

## 2017-03-19 RX ADMIN — DILTIAZEM HYDROCHLORIDE 60 MG: 60 TABLET, FILM COATED ORAL at 20:42

## 2017-03-19 RX ADMIN — CARBIDOPA AND LEVODOPA 1 TABLET: 25; 100 TABLET ORAL at 23:06

## 2017-03-19 RX ADMIN — TAZOBACTAM SODIUM AND PIPERACILLIN SODIUM 3.38 G: 375; 3 INJECTION, SOLUTION INTRAVENOUS at 20:42

## 2017-03-19 RX ADMIN — GUAIFENESIN 600 MG: 600 TABLET, EXTENDED RELEASE ORAL at 10:09

## 2017-03-19 RX ADMIN — METOPROLOL TARTRATE 25 MG: 25 TABLET ORAL at 10:08

## 2017-03-19 RX ADMIN — IPRATROPIUM BROMIDE AND ALBUTEROL SULFATE 3 ML: .5; 3 SOLUTION RESPIRATORY (INHALATION) at 14:33

## 2017-03-19 RX ADMIN — BUDESONIDE 0.5 MG: 0.5 INHALANT RESPIRATORY (INHALATION) at 06:51

## 2017-03-19 RX ADMIN — METOPROLOL TARTRATE 25 MG: 25 TABLET ORAL at 19:05

## 2017-03-19 RX ADMIN — PRIMIDONE 50 MG: 50 TABLET ORAL at 10:08

## 2017-03-19 RX ADMIN — VANCOMYCIN HYDROCHLORIDE 2000 MG: 1 INJECTION, POWDER, LYOPHILIZED, FOR SOLUTION INTRAVENOUS at 11:51

## 2017-03-19 RX ADMIN — METHYLPREDNISOLONE SODIUM SUCCINATE 60 MG: 125 INJECTION, POWDER, FOR SOLUTION INTRAMUSCULAR; INTRAVENOUS at 19:06

## 2017-03-19 RX ADMIN — CARBIDOPA AND LEVODOPA 1 TABLET: 25; 100 TABLET ORAL at 19:05

## 2017-03-19 RX ADMIN — LANTHANUM CARBONATE 500 MG: 500 TABLET, CHEWABLE ORAL at 10:09

## 2017-03-19 RX ADMIN — ARFORMOTEROL TARTRATE 15 MCG: 15 SOLUTION RESPIRATORY (INHALATION) at 06:51

## 2017-03-19 RX ADMIN — DILTIAZEM HYDROCHLORIDE 60 MG: 60 TABLET, FILM COATED ORAL at 19:06

## 2017-03-19 RX ADMIN — TAZOBACTAM SODIUM AND PIPERACILLIN SODIUM 3.38 G: 375; 3 INJECTION, SOLUTION INTRAVENOUS at 05:32

## 2017-03-19 RX ADMIN — ASPIRIN 81 MG: 81 TABLET ORAL at 10:09

## 2017-03-19 RX ADMIN — FAMOTIDINE 10 MG: 20 TABLET, FILM COATED ORAL at 10:09

## 2017-03-19 RX ADMIN — LANTHANUM CARBONATE 500 MG: 500 TABLET, CHEWABLE ORAL at 13:30

## 2017-03-19 RX ADMIN — GUAIFENESIN 600 MG: 600 TABLET, EXTENDED RELEASE ORAL at 19:05

## 2017-03-19 NOTE — PLAN OF CARE
Problem: Patient Care Overview (Adult)  Goal: Plan of Care Review  Outcome: Ongoing (interventions implemented as appropriate)    03/19/17 0833   Coping/Psychosocial Response Interventions   Plan Of Care Reviewed With patient   Patient Care Overview   Progress improving   Outcome Evaluation   Outcome Summary/Follow up Plan Suctioned once, trach care performed, skin care, v/s stable.        Goal: Adult Individualization and Mutuality  Outcome: Ongoing (interventions implemented as appropriate)  Goal: Discharge Needs Assessment  Outcome: Ongoing (interventions implemented as appropriate)    03/16/17 1109 03/17/17 1815 03/18/17 0426   Discharge Needs Assessment   Concerns To Be Addressed --  --  --    Readmission Within The Last 30 Days --  --  no previous admission in last 30 days   Discharge Facility/Level Of Care Needs nursing facility, skilled  ([facility]) --  --    Discharge Disposition --  still a patient --    Discharge Planning Comments Jonathan Das ( son/POA) [phone] --  --    Current Health   Anticipated Changes Related to Illness none --  --    Living Environment   Transportation Available ambulance --  --    Self-Care   Equipment Currently Used at Home oxygen;hospital bed --  --      03/19/17 0833   Discharge Needs Assessment   Concerns To Be Addressed no discharge needs identified   Readmission Within The Last 30 Days --    Discharge Facility/Level Of Care Needs --    Discharge Disposition --    Discharge Planning Comments --    Current Health   Anticipated Changes Related to Illness --    Living Environment   Transportation Available --    Self-Care   Equipment Currently Used at Home --          Problem: Respiratory Insufficiency (Adult)  Goal: Acid/Base Balance  Outcome: Ongoing (interventions implemented as appropriate)    03/19/17 0833   Respiratory Insufficiency (Adult)   Acid/Base Balance making progress toward outcome       Goal: Effective Ventilation  Outcome: Ongoing  (interventions implemented as appropriate)    03/19/17 0833   Respiratory Insufficiency (Adult)   Effective Ventilation making progress toward outcome         Problem: Skin Integrity Impairment, Risk/Actual (Adult)  Goal: Skin Integrity/Wound Healing  Outcome: Ongoing (interventions implemented as appropriate)    03/19/17 0833   Skin Integrity Impairment, Risk/Actual (Adult)   Skin Integrity/Wound Healing making progress toward outcome         Problem: Fall Risk (Adult)  Goal: Identify Related Risk Factors and Signs and Symptoms  Outcome: Ongoing (interventions implemented as appropriate)    03/19/17 0833   Fall Risk   Fall Risk: Related Risk Factors gait/mobility problems   Fall Risk: Signs and Symptoms presence of risk factors       Goal: Absence of Falls  Outcome: Ongoing (interventions implemented as appropriate)    03/19/17 0833   Fall Risk (Adult)   Absence of Falls making progress toward outcome

## 2017-03-19 NOTE — PROGRESS NOTES
[facility]  Phone: [phone]  Clinton Soares MD    Subjective:  LOS: 3    She states she is wheezing some today.  Some secretions noted.  She nods her head gently but does not communicate much.      Vital Signs past 24hrs  BP range: BP: (110-137)/(63-76) 137/76  Pulse range: Heart Rate:  [66-79] 66  Resp rate range: Resp:  [18-22] 18  Temp range: Temp (24hrs), Av °F (36.7 °C), Min:97.5 °F (36.4 °C), Max:98.5 °F (36.9 °C)    O2 Device: tracheostomy collarFlow (L/min):  [8] 8  Oxygen range:SpO2:  [96 %-100 %] 100 %   (!) 337 lb 9.6 oz (153 kg); Body mass index is 54.49 kg/(m^2).    Intake/Output Summary (Last 24 hours) at 17 1549  Last data filed at 17 0400   Gross per 24 hour   Intake      0 ml   Output    200 ml   Net   -200 ml       Physical Exam   Constitutional: She appears well-developed.   Eyes: Pupils are equal, round, and reactive to light.   Cardiovascular: Normal rate and regular rhythm.    No murmur heard.  Pulmonary/Chest: She has decreased breath sounds (poor effort). She has wheezes (very mild). She has no rhonchi. She has no rales.   Trach in place   Abdominal: Soft. Bowel sounds are normal. She exhibits no mass. There is no tenderness.   Obese  PEG noted   Musculoskeletal: She exhibits no edema.   Neurological: She is alert.     Results Review:    I have reviewed the laboratory and imaging data since the last note by [facility] physician.  My annotations are noted in assessment and plan.    Medication Review:  I have reviewed the current MAR.  My annotations are noted in assessment and plan.      Pharmacy to dose vancomycin      Plan   PCCM Problems  Chronic respiratory failure with tracheostomy  Hypoxic respiratory failure on supplemental oxygen  Possible pneumonia on antibiotics  Morbid obesity  Relevant Medical Diagnoses  Diabetes mellitus  Chronic kidney disease  Atrial fibrillation  Chronic diastolic heart failure      Plan of Treatment  ABG noted.  Hypoventilation with  elevated carbon dioxide.  However appears compensated.  Continue with trach.    Pro-calcitonin elevated.  Reasonable to continue antibiotics.    Continue supplemental oxygen through the trach.    Some wheezing is noted on exam.  We will give her 1 dose of IV Solu-Medrol tonight and scheduled nebs for 1 day only    Clinton Soares MD  03/19/17  3:49 PM    EMR Dragon/Transcription disclaimer:   Some of this note may be an electronic transcription/translation of spoken language to printed text. The electronic translation of spoken language may permit erroneous, or at times, nonsensical words or phrases to be inadvertently transcribed; Although I have reviewed the note for such errors, some may still exist.

## 2017-03-19 NOTE — PROGRESS NOTES
"      Name: Lucy Sevilla ADMIT: 3/16/2017   : 1960  PCP: Eloy Cancino MD    MRN: [id number] LOS: 3 days   AGE/SEX: 56 y.o. female  ROOM: [facility]     Subjective   Denies any specific complaints.    Objective   Vital Signs  Temp:  [97.5 °F (36.4 °C)-98.5 °F (36.9 °C)] 97.5 °F (36.4 °C)  Heart Rate:  [71-86] 74  Resp:  [18-22] 20  BP: (110-137)/(63-76) 137/76  SpO2:  [96 %-100 %] 98 %  on  Flow (L/min):  [8] 8;   O2 Device: tracheostomy collar  Body mass index is 54.49 kg/(m^2).    Intake/Output Summary (Last 24 hours) at 17 1115  Last data filed at 17 0400   Gross per 24 hour   Intake      0 ml   Output    200 ml   Net   -200 ml     Wt Readings from Last 1 Encounters:   17 0615 (!) 337 lb 9.6 oz (153 kg)   17 0634 (!) 322 lb (146 kg)   17 0159 (!) 374 lb (170 kg)     Wt Readings from Last 3 Encounters:   17 (!) 337 lb 9.6 oz (153 kg)       Objective:  General Appearance:  Comfortable, in no acute distress and ill-appearing.    Vital signs: (most recent): Blood pressure 137/76, pulse 74, temperature 97.5 °F (36.4 °C), temperature source Oral, resp. rate 20, height 66\" (167.6 cm), weight 337 lb 9.6 oz (153 kg), SpO2 98 %.    HEENT: (Tracheostomy in place)    Lungs:  Normal effort.  There are decreased breath sounds.    Heart: Normal rate.  Regular rhythm.    Abdomen: Abdomen is soft and non-distended.  (Morbidly obese)Bowel sounds are normal.   There is no abdominal tenderness.     Extremities: There is no dependent edema.    Neurological: Patient is alert and oriented to person, place and time.  (Nonverbal).    Skin:  Warm and dry.          Results Review:       I reviewed the patient's new clinical results.    Results from last 7 days  Lab Units 17  0719 17  0443 17  0600 17  0644 17  0232   WBC 10*3/mm3 6.05 8.20 6.89 7.47 8.35   HEMOGLOBIN g/dL 10.5* 10.2* 10.7* 11.6* 11.7*   PLATELETS 10*3/mm3 191 185 214 217 266       Results from last 7 " days  Lab Units 03/19/17  0719 03/18/17  0443 03/17/17  0600 03/16/17  1540 03/16/17  0644 03/16/17  0232   SODIUM mmol/L 139 142 140  --  140 139   POTASSIUM mmol/L 4.4 4.0 4.3 4.4 4.8 4.5   CHLORIDE mmol/L 100 100 98  --  98 98   TOTAL CO2 mmol/L 25.5 28.2 25.8  --  28.1 28.0   BUN mg/dL 35* 41* 45*  --  53* 49*   CREATININE mg/dL 1.43* 2.15* 1.79*  --  2.28* 2.39*   GLUCOSE mg/dL 137* 147* 140*  --  131* 150*   Estimated Creatinine Clearance: 68.7 mL/min (by C-G formula based on Cr of 1.43).    Results from last 7 days  Lab Units 03/19/17  0719 03/18/17 0443 03/17/17  0600 03/16/17  1540 03/16/17  0644 03/16/17  0232   CALCIUM mg/dL 9.2 9.0 9.5  --  9.5 9.6   ALBUMIN g/dL  --   --   --   --   --  3.80   MAGNESIUM mg/dL  --   --   --  2.0  --   --        Results from last 7 days  Lab Units 03/19/17  0719 03/16/17  0348   PROCALCITONIN ng/mL 0.94*  --    LACTATE mmol/L  --  1.4   No results found for: STREPPNEUAG, LEGANTIGENUR    Results from last 7 days  Lab Units 03/17/17  0910 03/16/17  0419   BLOODCX   --  No growth at 3 days   RESPCX  Scant growth (1+) Gram Positive Rods*  Rare Normal Respiratory Bella  --    GRAM STAIN RESULT  Occasional WBCs seen  Occasional Mixed bacterial morphotypes seen on Gram Stain  --      GLUCOSE   Date/Time Value Ref Range Status   03/19/2017 0728 128 70 - 130 mg/dL Final   03/18/2017 2225 186 (H) 70 - 130 mg/dL Final   03/18/2017 1722 143 (H) 70 - 130 mg/dL Final   03/18/2017 1209 175 (H) 70 - 130 mg/dL Final   03/18/2017 0757 138 (H) 70 - 130 mg/dL Final   03/17/2017 2020 179 (H) 70 - 130 mg/dL Final   03/17/2017 1709 186 (H) 70 - 130 mg/dL Final   03/17/2017 1111 180 (H) 70 - 130 mg/dL Final           arformoterol 15 mcg Nebulization BID - RT   aspirin 81 mg Oral Daily   budesonide 0.5 mg Nebulization BID - RT   carbidopa-levodopa 1 tablet Per G Tube Q6H   diltiaZEM 60 mg Per G Tube TID   enoxaparin 30 mg Subcutaneous Daily   famotidine 10 mg Per G Tube BID   guaiFENesin 600  mg Oral BID   insulin aspart 0-7 Units Subcutaneous 4x Daily AC & at Bedtime   insulin detemir 20 Units Subcutaneous BID   ipratropium-albuterol 3 mL Nebulization 4x Daily - RT   lanthanum 500 mg Oral TID With Meals   metoprolol tartrate 25 mg Per G Tube BID   piperacillin-tazobactam 3.375 g Intravenous Q8H   polyethylene glycol 17 g Per G Tube Daily   primidone 50 mg Oral BID   vancomycin 2,000 mg Intravenous Q24H       Pharmacy to dose vancomycin    Diet Regular; Consistent Carbohydrate, Cardiac      Assessment/Plan   Assessment:     Active Hospital Problems (** Indicates Principal Problem)    Diagnosis Date Noted   • **Healthcare associated bacterial pneumonia - covering for gram-negative and MRSA organisms [J15.9] 03/17/2017   • Acute kidney injury [N17.9] 03/17/2017   • Morbid obesity with BMI of 50.0-59.9, adult [E66.01, Z68.43] 03/17/2017   • Anoxic encephalopathy [G93.1] 03/17/2017   • Essential tremor [G25.0] 03/17/2017   • Acute on chronic respiratory failure with hypoxia [J96.21] 03/16/2017   • Tracheostomy in place [Z93.0] 03/16/2017   • Hepatitis C [B19.20] 03/16/2017   • Type 2 diabetes mellitus without complication [E11.9] 03/16/2017   • Sleep apnea, obstructive [G47.33] 03/16/2017   • Hypertension [I10] 03/16/2017   • Atrial fibrillation [I48.91] 03/16/2017   • Chronic diastolic congestive heart failure [I50.32] 03/16/2017      Resolved Hospital Problems    Diagnosis Date Noted Date Resolved   No resolved problems to display.       Plan:   - Admitted to monitored unit  - Continue ZOSYN and VANCOMYCIN covering for nosocomial exposures.  - Blood cultures x2 done in ED negative thus far.    - Sputum for gram stain and culture pending.  - Supplemental oxygen to keep SaO2 > 92%  - Pulmonary consulted.  Help appreciated.  - GABI on admit seems rsolved.  Cr down to 1.4 from 2.4.    - Neurology following for tremor.  Mysoline started.  - Physical therapy consult        Tian Caicedo MD  [facility]  03/19/17  11:15 AM

## 2017-03-19 NOTE — PROGRESS NOTES
"Pharmacokinetic Consult - Vancomycin Dosing (Follow-up Note)    Lucy Sevilla is on day #4 pharmacy to dose vancomycin for pneumonia.  Pharmacy dosing vancomycin per Dr. Lemon's request.   Goal trough: 15-20 mg/L     Relevant clinical data and objective history reviewed:  56 y.o. female 66\" (167.6 cm) (!) 337 lb 9.6 oz (153 kg)    Past Medical History   Diagnosis Date   • Anemia    • Atrial fibrillation    • CHF (congestive heart failure)    • Diabetes mellitus    • Hepatitis C    • Hyperlipidemia    • Hypertension    • Renal disorder    • Sleep apnea, obstructive    • Tracheostomy in place      CREATININE   Date Value Ref Range Status   03/19/2017 1.43 (H) 0.57 - 1.00 mg/dL Final   03/18/2017 2.15 (H) 0.57 - 1.00 mg/dL Final   03/17/2017 1.79 (H) 0.57 - 1.00 mg/dL Final     BUN   Date Value Ref Range Status   03/19/2017 35 (H) 6 - 20 mg/dL Final     Estimated Creatinine Clearance: 68.7 mL/min (by C-G formula based on Cr of 1.43).    Lab Results   Component Value Date    WBC 6.05 03/19/2017     Temp Readings from Last 3 Encounters:   03/18/17 98.5 °F (36.9 °C) (Oral)         IV Anti-Infectives     Ordered     Dose/Rate Route Frequency Start Stop    03/16/17 1003  vancomycin 2250 mg/500 mL 0.9% NS IVPB ([facility])     Ordering Provider:  Cedric Lemon MD    15 mg/kg × 146 kg  over 180 Minutes Intravenous Every 24 Hours 03/17/17 0430      03/16/17 0527  piperacillin-tazobactam (ZOSYN) 3.375 g in dextrose 50 mL IVPB (premix)     Ordering Provider:  Cedric Lemon MD    3.375 g  over 4 Hours Intravenous Every 8 Hours 03/16/17 1200      03/16/17 0527  Pharmacy to dose vancomycin     Ordering Provider:  Cedric Lemon MD     Does not apply Continuous PRN 03/16/17 0526      03/16/17 0350  piperacillin-tazobactam (ZOSYN) 4.5 g in dextrose 100 mL IVPB (premix)     Ordering Provider:  CAROLE Fiore    4.5 g Intravenous Once 03/16/17 0352 03/16/17 0441    03/16/17 0350  vancomycin 3000 mg/500 mL 0.9% NS IVPB ([facility])     Ordering " Provider:  CAROLE Fiore    17.6 mg/kg × 170 kg Intravenous Once 03/16/17 0352 03/16/17 0441         Lab Results   Component Value Date    CLAUDETTE 21.60 (H) 03/19/2017       Assessment/Plan  Vancomycin trough = 21.6 mcg/mL just above therapeutic level.   Renal function improved today.  Will adjust Vancomycin to 2000 mg IVPB Q 24 hours.  Expect to remain in therapeutic range (15-20 mcg/mL) with adjustment of dose and improved renal function.  Will monitor serum creatinine at least every 48 hours per dosing recommendations. Vancomycin level should be repeated after 3-5 doses. Pharmacy will continue to follow daily while on vancomycin and adjust as needed. Thank you for consult,    Love Dewitt [facility]   Clinical Staff Pharmacist

## 2017-03-19 NOTE — PROGRESS NOTES
Patient Identification:  NAME:  Lucy Sevilla  Age:  56 y.o.   Sex:  female   :  1960   MRN:  [id number]       Chief complaint: Metabolic encephalopathy, parkinsonism    History of present illness:  This patient is awake alert doing okay she is tolerating the Sinemet without problems.      Past medical history:  Past Medical History   Diagnosis Date   • Anemia    • Atrial fibrillation    • CHF (congestive heart failure)    • Diabetes mellitus    • Hepatitis C    • Hyperlipidemia    • Hypertension    • Renal disorder    • Sleep apnea, obstructive    • Tracheostomy in place        Allergies:  Codeine    Home medications:  Prescriptions Prior to Admission   Medication Sig Dispense Refill Last Dose   • acetaminophen (TYLENOL) 325 MG tablet 650 mg by Per G Tube route Every 6 (Six) Hours As Needed for Mild Pain (1-3).      • albuterol (ACCUNEB) 0.63 MG/3ML nebulizer solution Take 1 ampule by nebulization Every 4 (Four) Hours As Needed for Wheezing.      • albuterol (ACCUNEB) 0.63 MG/3ML nebulizer solution Take 1 ampule by nebulization Every 6 (Six) Hours. 1 dose via trach every six hours related to chronic obstructive pulmonary disease      • arformoterol (BROVANA) 15 MCG/2ML nebulizer solution Take  by nebulization 2 (Two) Times a Day. One dose via trach every twelve hours      • aspirin 81 MG EC tablet 81 mg by Per G Tube route Daily.      • B Complex-C-Folic Acid (JOY-MARLENY PO) 1 tablet by Per G Tube route Daily.      • budesonide (PULMICORT) 0.5 MG/2ML nebulizer solution Take 0.5 mg by nebulization 2 (Two) Times a Day. 1 puff inhale orally two times a day      • diltiaZEM (CARDIZEM) 60 MG tablet 60 mg by Per G Tube route 3 (Three) Times a Day.      • enoxaparin (LOVENOX) 40 MG/0.4ML solution syringe Inject 40 mg under the skin Daily.      • famotidine (PEPCID) 20 MG tablet 20 mg by Per G Tube route 2 (Two) Times a Day.      • furosemide (LASIX) 20 MG tablet 20 mg by Per G Tube route Daily.      • haloperidol  (HALDOL) 5 MG tablet 2.5 mg by Per G Tube route 2 (Two) Times a Day As Needed for Agitation. Give 1/2 tablet every twelve hours as needed for agitation      • insulin detemir (LEVEMIR) 100 UNIT/ML injection Inject 20 Units under the skin 2 (Two) Times a Day.      • insulin NPH-insulin regular (NOVOLIN 70/30) (70-30) 100 UNIT/ML injection Inject  under the skin 2 (Two) Times a Day With Meals. Sliding scale      • ipratropium-albuterol (COMBIVENT)  MCG/ACT inhaler Inhale 1 puff Every 6 (Six) Hours As Needed for Wheezing. 1 dose via trach every six hours as needed for SOA      • Lanthanum Carbonate 750 MG pack 750 mg by Per G Tube route Every 8 (Eight) Hours.      • levoFLOXacin (LEVAQUIN) 750 MG tablet 750 mg by Per G Tube route Daily.      • metoprolol tartrate (LOPRESSOR) 25 MG tablet 25 mg by Per G Tube route 2 (Two) Times a Day.      • ondansetron (ZOFRAN) 4 MG tablet 4 mg by Per G Tube route Every 4 (Four) Hours As Needed for Nausea or Vomiting.      • polyethylene glycol (MIRALAX) packet 17 g by Per G Tube route Daily.      • traMADol (ULTRAM) 50 MG tablet 50 mg by Per G Tube route Every 6 (Six) Hours As Needed for Moderate Pain (4-6).      • tuberculin (TUBERSOL) 5 UNIT/0.1ML injection Inject 5 Units into the skin Every Night.           Hospital medications:    arformoterol 15 mcg Nebulization BID - RT   aspirin 81 mg Oral Daily   budesonide 0.5 mg Nebulization BID - RT   carbidopa-levodopa 1 tablet Per G Tube Q6H   diltiaZEM 60 mg Per G Tube TID   enoxaparin 30 mg Subcutaneous Daily   famotidine 10 mg Per G Tube BID   guaiFENesin 600 mg Oral BID   insulin aspart 0-7 Units Subcutaneous 4x Daily AC & at Bedtime   insulin detemir 20 Units Subcutaneous BID   ipratropium-albuterol 3 mL Nebulization 4x Daily - RT   lanthanum 500 mg Oral TID With Meals   metoprolol tartrate 25 mg Per G Tube BID   piperacillin-tazobactam 3.375 g Intravenous Q8H   polyethylene glycol 17 g Per G Tube Daily   primidone 50 mg Oral  BID   vancomycin 2,000 mg Intravenous Q24H       Pharmacy to dose vancomycin      •  acetaminophen  •  dextrose  •  dextrose  •  glucagon (human recombinant)  •  haloperidol  •  ondansetron  •  Pharmacy to dose vancomycin  •  sodium chloride  •  Insert peripheral IV **AND** sodium chloride  •  traMADol      Objective:  Vitals Ranges:   Temp:  [97.5 °F (36.4 °C)-98.5 °F (36.9 °C)] 97.5 °F (36.4 °C)  Heart Rate:  [71-79] 74  Resp:  [18-22] 20  BP: (110-137)/(63-76) 137/76      Physical Exam:  Patient is awake moderately alert mouth is correct answers normal cranial nerves II through VII tongue is midline she has rigidity but I believe it is less so than yesterday she still has bradykinesia but no resting tremor    Results review:   I reviewed the patient's new clinical results.    Data review:  Lab Results (last 24 hours)     Procedure Component Value Units Date/Time    POC Glucose Fingerstick [[id number]]  (Abnormal) Collected:  03/18/17 1722    Specimen:  Blood Updated:  03/18/17 1723     Glucose 143 (H) mg/dL     Narrative:       Meter: [id number] : [id number] [facility]    Blood Gas, Arterial [[id number]]  (Abnormal) Collected:  03/18/17 2123    Specimen:  Arterial Blood Updated:  03/18/17 2125     Site Arterial: left radial      Fred's Test Positive      pH, Arterial 7.400 pH units      pCO2, Arterial 51.6 (H) mm Hg      pO2, Arterial 111.6 (H) mm Hg      HCO3, Arterial 32.0 (H) mmol/L      Base Excess, Arterial 5.7 (H) mmol/L      O2 Saturation Calculated 98.3 %      A-a Gradiant 0.7 mmHg      Barometric Pressure for Blood Gas 753.9 mmHg      Modality T Collar      FIO2 30 %      Rate 20 Breaths/minute     Narrative:       SATS 100% Meter: [id number] : [id number] Jessica Bowers    POC Glucose Fingerstick [[id number]]  (Abnormal) Collected:  03/18/17 2225    Specimen:  Blood Updated:  03/18/17 2226     Glucose 186 (H) mg/dL     Narrative:       Meter: [id number] : [id number] [facility]  Culture [[id number]]  (Normal) Collected:  03/16/17 0348    Specimen:  Blood from Arm, Left Updated:  03/19/17 0402     Blood Culture No growth at 3 days     Blood Culture [[id number]]  (Normal) Collected:  03/16/17 0419    Specimen:  Blood from Arm, Right Updated:  03/19/17 0501     Blood Culture No growth at 3 days     POC Glucose Fingerstick [[id number]]  (Normal) Collected:  03/19/17 0728    Specimen:  Blood Updated:  03/19/17 0730     Glucose 128 mg/dL     Narrative:       Meter: [id number] : [id number] Nenita Francois    CBC & Differential [[id number]] Collected:  03/19/17 0719    Specimen:  Blood Updated:  03/19/17 0741    Narrative:       The following orders were created for panel order CBC & Differential.  Procedure                               Abnormality         Status                     ---------                               -----------         ------                     CBC Auto Differential[[id number]]         Abnormal            Final result                 Please view results for these tests on the individual orders.    CBC Auto Differential [[id number]]  (Abnormal) Collected:  03/19/17 0719    Specimen:  Blood Updated:  03/19/17 0741     WBC 6.05 10*3/mm3      RBC 3.75 (L) 10*6/mm3      Hemoglobin 10.5 (L) g/dL      Hematocrit 33.3 (L) %      MCV 88.8 fL      MCH 28.0 pg      MCHC 31.5 (L) g/dL      RDW 17.3 (H) %      RDW-SD 56.4 (H) fl      MPV 9.1 fL      Platelets 191 10*3/mm3      Neutrophil % 66.3 %      Lymphocyte % 21.0 %      Monocyte % 6.3 %      Eosinophil % 5.6 %      Basophil % 0.5 %      Immature Grans % 0.3 %      Neutrophils, Absolute 4.01 10*3/mm3      Lymphocytes, Absolute 1.27 10*3/mm3      Monocytes, Absolute 0.38 10*3/mm3      Eosinophils, Absolute 0.34 10*3/mm3      Basophils, Absolute 0.03 10*3/mm3      Immature Grans, Absolute 0.02 10*3/mm3     Basic Metabolic Panel [[id number]]  (Abnormal) Collected:  03/19/17 0719    Specimen:  Blood Updated:  03/19/17 0803     Glucose 137 (H)  "mg/dL      BUN 35 (H) mg/dL      Creatinine 1.43 (H) mg/dL      Sodium 139 mmol/L      Potassium 4.4 mmol/L      Chloride 100 mmol/L      CO2 25.5 mmol/L      Calcium 9.2 mg/dL      eGFR Non African Amer 38 (L) mL/min/1.73      BUN/Creatinine Ratio 24.5      Anion Gap 13.5 mmol/L     Narrative:       GFR Normal >60  Chronic Kidney Disease <60  Kidney Failure <15    Vancomycin, Trough [[id number]]  (Abnormal) Collected:  03/19/17 0719    Specimen:  Blood Updated:  03/19/17 0806     Vancomycin Trough 21.60 (H) mcg/mL     Procalcitonin [[id number]]  (Abnormal) Collected:  03/19/17 0719    Specimen:  Blood Updated:  03/19/17 0821     Procalcitonin 0.94 (C) ng/mL     Narrative:       As a Marker for Sepsis (Non-Neonates):   1. <0.5 ng/mL represents a low risk of severe sepsis and/or septic shock.  1. >2 ng/mL represents a high risk of severe sepsis and/or septic shock.    As a Marker for Lower Respiratory Tract Infections that require antibiotic therapy:  PCT on Admission     Antibiotic Therapy             6-12 Hrs later  > 0.5                Strongly Recommended            >0.25 - <0.5         Recommended  0.1 - 0.25           Discouraged                   Remeasure/reassess PCT  <0.1                 Strongly Discouraged          Remeasure/reassess PCT      As 28 day mortality risk marker: \"Change in Procalcitonin Result\" (> 80 % or <=80 %) if Day 0 (or Day 1) and Day 4 values are available. Refer to http://www.[employer]-pct-calculator.com/   Change in PCT <=80 %   A decrease of PCT levels below or equal to 80 % defines a positive change in PCT test result representing a higher risk for 28-day all-cause mortality of patients diagnosed with severe sepsis or septic shock.  Change in PCT > 80 %   A decrease of PCT levels of more than 80 % defines a negative change in PCT result representing a lower risk for 28-day all-cause mortality of patients diagnosed with severe sepsis or septic shock.                Respiratory Culture " [[id number]] Collected:  03/17/17 0910    Specimen:  Sputum from ET Suction Updated:  03/19/17 1123     Respiratory Culture Scant growth (1+) Normal Respiratory Bella      Gram Stain Result Occasional WBCs seen              Occasional Mixed bacterial morphotypes seen on Gram Stain    POC Glucose Fingerstick [[id number]]  (Abnormal) Collected:  03/19/17 1212    Specimen:  Blood Updated:  03/19/17 1213     Glucose 170 (H) mg/dL     Narrative:       Meter: [id number] : [id number] [employer]           Imaging:  Imaging Results (last 24 hours)     ** No results found for the last 24 hours. **             Assessment and Plan:     Principal Problem:    Healthcare associated bacterial pneumonia - covering for gram-negative and MRSA organisms  Active Problems:    Acute on chronic respiratory failure with hypoxia    Tracheostomy in place    Hepatitis C    Type 2 diabetes mellitus without complication    Sleep apnea, obstructive    Hypertension    Atrial fibrillation    Chronic diastolic congestive heart failure    Acute kidney injury    Morbid obesity with BMI of 50.0-59.9, adult    Anoxic encephalopathy    Essential tremor    Awake alert and metabolic encephalopathy is better I do believe that her rigidity is a bit less on the Sinemet and I will continue that dosing indefinitely for her parkinsonism.  At this point she appears stable and I will sign off in follow-up when necessary reconsult thank you      Micha Cortés MD  03/19/17  2:09 PM

## 2017-03-20 LAB
GLUCOSE BLDC GLUCOMTR-MCNC: 168 MG/DL (ref 70–130)
GLUCOSE BLDC GLUCOMTR-MCNC: 217 MG/DL (ref 70–130)
GLUCOSE BLDC GLUCOMTR-MCNC: 221 MG/DL (ref 70–130)
GLUCOSE BLDC GLUCOMTR-MCNC: 221 MG/DL (ref 70–130)

## 2017-03-20 PROCEDURE — 25010000002 ENOXAPARIN PER 10 MG: Performed by: HOSPITALIST

## 2017-03-20 PROCEDURE — 94799 UNLISTED PULMONARY SVC/PX: CPT

## 2017-03-20 PROCEDURE — 25010000002 VANCOMYCIN: Performed by: HOSPITALIST

## 2017-03-20 PROCEDURE — 25010000002 PIPERACILLIN SOD-TAZOBACTAM PER 1 G: Performed by: HOSPITALIST

## 2017-03-20 PROCEDURE — 82962 GLUCOSE BLOOD TEST: CPT

## 2017-03-20 PROCEDURE — 63710000001 INSULIN ASPART PER 5 UNITS: Performed by: HOSPITALIST

## 2017-03-20 PROCEDURE — 97110 THERAPEUTIC EXERCISES: CPT

## 2017-03-20 RX ADMIN — INSULIN ASPART 3 UNITS: 100 INJECTION, SOLUTION INTRAVENOUS; SUBCUTANEOUS at 11:47

## 2017-03-20 RX ADMIN — PRIMIDONE 50 MG: 50 TABLET ORAL at 17:02

## 2017-03-20 RX ADMIN — ARFORMOTEROL TARTRATE 15 MCG: 15 SOLUTION RESPIRATORY (INHALATION) at 21:30

## 2017-03-20 RX ADMIN — IPRATROPIUM BROMIDE AND ALBUTEROL SULFATE 3 ML: .5; 3 SOLUTION RESPIRATORY (INHALATION) at 15:07

## 2017-03-20 RX ADMIN — POLYETHYLENE GLYCOL 3350 17 G: 17 POWDER, FOR SOLUTION ORAL at 08:03

## 2017-03-20 RX ADMIN — METOPROLOL TARTRATE 25 MG: 25 TABLET ORAL at 08:03

## 2017-03-20 RX ADMIN — GUAIFENESIN 600 MG: 600 TABLET, EXTENDED RELEASE ORAL at 08:03

## 2017-03-20 RX ADMIN — INSULIN DETEMIR 20 UNITS: 100 INJECTION, SOLUTION SUBCUTANEOUS at 17:28

## 2017-03-20 RX ADMIN — DILTIAZEM HYDROCHLORIDE 60 MG: 60 TABLET, FILM COATED ORAL at 16:56

## 2017-03-20 RX ADMIN — IPRATROPIUM BROMIDE AND ALBUTEROL SULFATE 3 ML: .5; 3 SOLUTION RESPIRATORY (INHALATION) at 11:44

## 2017-03-20 RX ADMIN — FAMOTIDINE 10 MG: 20 TABLET, FILM COATED ORAL at 17:01

## 2017-03-20 RX ADMIN — DILTIAZEM HYDROCHLORIDE 60 MG: 60 TABLET, FILM COATED ORAL at 20:23

## 2017-03-20 RX ADMIN — BUDESONIDE 0.5 MG: 0.5 INHALANT RESPIRATORY (INHALATION) at 06:56

## 2017-03-20 RX ADMIN — CARBIDOPA AND LEVODOPA 1 TABLET: 25; 100 TABLET ORAL at 11:48

## 2017-03-20 RX ADMIN — IPRATROPIUM BROMIDE AND ALBUTEROL SULFATE 3 ML: .5; 3 SOLUTION RESPIRATORY (INHALATION) at 06:56

## 2017-03-20 RX ADMIN — IPRATROPIUM BROMIDE AND ALBUTEROL SULFATE 3 ML: .5; 3 SOLUTION RESPIRATORY (INHALATION) at 03:44

## 2017-03-20 RX ADMIN — BUDESONIDE 0.5 MG: 0.5 INHALANT RESPIRATORY (INHALATION) at 21:30

## 2017-03-20 RX ADMIN — DILTIAZEM HYDROCHLORIDE 60 MG: 60 TABLET, FILM COATED ORAL at 08:03

## 2017-03-20 RX ADMIN — INSULIN ASPART 2 UNITS: 100 INJECTION, SOLUTION INTRAVENOUS; SUBCUTANEOUS at 17:28

## 2017-03-20 RX ADMIN — ARFORMOTEROL TARTRATE 15 MCG: 15 SOLUTION RESPIRATORY (INHALATION) at 09:24

## 2017-03-20 RX ADMIN — TAZOBACTAM SODIUM AND PIPERACILLIN SODIUM 3.38 G: 375; 3 INJECTION, SOLUTION INTRAVENOUS at 20:23

## 2017-03-20 RX ADMIN — ASPIRIN 81 MG: 81 TABLET ORAL at 08:03

## 2017-03-20 RX ADMIN — CARBIDOPA AND LEVODOPA 1 TABLET: 25; 100 TABLET ORAL at 23:25

## 2017-03-20 RX ADMIN — PRIMIDONE 50 MG: 50 TABLET ORAL at 08:03

## 2017-03-20 RX ADMIN — LANTHANUM CARBONATE 500 MG: 500 TABLET, CHEWABLE ORAL at 08:03

## 2017-03-20 RX ADMIN — GUAIFENESIN 600 MG: 600 TABLET, EXTENDED RELEASE ORAL at 17:01

## 2017-03-20 RX ADMIN — INSULIN ASPART 3 UNITS: 100 INJECTION, SOLUTION INTRAVENOUS; SUBCUTANEOUS at 21:39

## 2017-03-20 RX ADMIN — METOPROLOL TARTRATE 25 MG: 25 TABLET ORAL at 17:01

## 2017-03-20 RX ADMIN — TAZOBACTAM SODIUM AND PIPERACILLIN SODIUM 3.38 G: 375; 3 INJECTION, SOLUTION INTRAVENOUS at 03:20

## 2017-03-20 RX ADMIN — CARBIDOPA AND LEVODOPA 1 TABLET: 25; 100 TABLET ORAL at 17:01

## 2017-03-20 RX ADMIN — LANTHANUM CARBONATE 500 MG: 500 TABLET, CHEWABLE ORAL at 17:01

## 2017-03-20 RX ADMIN — TAZOBACTAM SODIUM AND PIPERACILLIN SODIUM 3.38 G: 375; 3 INJECTION, SOLUTION INTRAVENOUS at 12:14

## 2017-03-20 RX ADMIN — FAMOTIDINE 10 MG: 20 TABLET, FILM COATED ORAL at 08:03

## 2017-03-20 RX ADMIN — LANTHANUM CARBONATE 500 MG: 500 TABLET, CHEWABLE ORAL at 11:48

## 2017-03-20 RX ADMIN — INSULIN DETEMIR 20 UNITS: 100 INJECTION, SOLUTION SUBCUTANEOUS at 08:03

## 2017-03-20 RX ADMIN — CARBIDOPA AND LEVODOPA 1 TABLET: 25; 100 TABLET ORAL at 05:10

## 2017-03-20 RX ADMIN — INSULIN ASPART 3 UNITS: 100 INJECTION, SOLUTION INTRAVENOUS; SUBCUTANEOUS at 08:36

## 2017-03-20 RX ADMIN — VANCOMYCIN HYDROCHLORIDE 2000 MG: 1 INJECTION, POWDER, LYOPHILIZED, FOR SOLUTION INTRAVENOUS at 10:11

## 2017-03-20 RX ADMIN — ENOXAPARIN SODIUM 30 MG: 30 INJECTION SUBCUTANEOUS at 08:03

## 2017-03-20 NOTE — PLAN OF CARE
Problem: Patient Care Overview (Adult)  Goal: Plan of Care Review  Outcome: Ongoing (interventions implemented as appropriate)    03/20/17 1357   Coping/Psychosocial Response Interventions   Plan Of Care Reviewed With patient   Patient Care Overview   Progress progress toward functional goals as expected   Outcome Evaluation   Outcome Summary/Follow up Plan trach care VSS ENT consulted for 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          03/20/17 1357   Coping/Psychosocial Response Interventions   Plan Of Care Reviewed With patient   Patient Care Overview   Progress progress toward functional goals as expected   Outcome Evaluation   Outcome Summary/Follow up Plan trach care completed VSS ENT consulted for 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

## 2017-03-20 NOTE — PROGRESS NOTES
"      Name: Lucy Sevilla ADMIT: 3/16/2017   : 1960  PCP: Eloy Cancino MD    MRN: [id number] LOS: 4 days   AGE/SEX: 56 y.o. female  ROOM: [facility]     Subjective   Denies any specific complaints.    Objective   Vital Signs  Temp:  [98 °F (36.7 °C)-98.3 °F (36.8 °C)] 98 °F (36.7 °C)  Heart Rate:  [66-85] 78  Resp:  [16-20] 18  BP: (112-149)/(64-72) 112/64  SpO2:  [95 %-100 %] 95 %  on  Flow (L/min):  [8] 8;   O2 Device: tracheostomy collar  Body mass index is 54.49 kg/(m^2).    Intake/Output Summary (Last 24 hours) at 17 1039  Last data filed at 17 0800   Gross per 24 hour   Intake    100 ml   Output      0 ml   Net    100 ml     Wt Readings from Last 1 Encounters:   17 0615 (!) 337 lb 9.6 oz (153 kg)   17 0634 (!) 322 lb (146 kg)   17 0159 (!) 374 lb (170 kg)     Wt Readings from Last 3 Encounters:   17 (!) 337 lb 9.6 oz (153 kg)       Objective:  General Appearance:  Comfortable, in no acute distress and ill-appearing.    Vital signs: (most recent): Blood pressure 112/64, pulse 78, temperature 98 °F (36.7 °C), temperature source Oral, resp. rate 18, height 66\" (167.6 cm), weight 337 lb 9.6 oz (153 kg), SpO2 95 %.    HEENT: (Tracheostomy in place)    Lungs:  Normal effort.  There are decreased breath sounds.    Heart: Normal rate.  Regular rhythm.    Abdomen: Abdomen is soft and non-distended.  (Morbidly obese)Bowel sounds are normal.   There is no abdominal tenderness.     Extremities: There is no dependent edema.    Neurological: Patient is alert and oriented to person, place and time.  (Nonverbal).    Skin:  Warm and dry.          Results Review:       I reviewed the patient's new clinical results.    Results from last 7 days  Lab Units 17  0719 17  0443 17  0600 17  0644 17  0232   WBC 10*3/mm3 6.05 8.20 6.89 7.47 8.35   HEMOGLOBIN g/dL 10.5* 10.2* 10.7* 11.6* 11.7*   PLATELETS 10*3/mm3 191 185 214 217 266       Results from last  " days  Lab Units 03/19/17  0719 03/18/17  0443 03/17/17  0600 03/16/17  1540 03/16/17  0644 03/16/17  0232   SODIUM mmol/L 139 142 140  --  140 139   POTASSIUM mmol/L 4.4 4.0 4.3 4.4 4.8 4.5   CHLORIDE mmol/L 100 100 98  --  98 98   TOTAL CO2 mmol/L 25.5 28.2 25.8  --  28.1 28.0   BUN mg/dL 35* 41* 45*  --  53* 49*   CREATININE mg/dL 1.43* 2.15* 1.79*  --  2.28* 2.39*   GLUCOSE mg/dL 137* 147* 140*  --  131* 150*   Estimated Creatinine Clearance: 68.7 mL/min (by C-G formula based on Cr of 1.43).    Results from last 7 days  Lab Units 03/19/17  0719 03/18/17 0443 03/17/17  0600 03/16/17  1540 03/16/17  0644 03/16/17  0232   CALCIUM mg/dL 9.2 9.0 9.5  --  9.5 9.6   ALBUMIN g/dL  --   --   --   --   --  3.80   MAGNESIUM mg/dL  --   --   --  2.0  --   --        Results from last 7 days  Lab Units 03/19/17  0719 03/16/17  0348   PROCALCITONIN ng/mL 0.94*  --    LACTATE mmol/L  --  1.4   No results found for: STREPPNEUAG, LEGANTIGENUR    Results from last 7 days  Lab Units 03/17/17  0910 03/16/17  0419   BLOODCX   --  No growth at 4 days   RESPCX  Scant growth (1+) Normal Respiratory Bella  --    GRAM STAIN RESULT  Occasional WBCs seen  Occasional Mixed bacterial morphotypes seen on Gram Stain  --      GLUCOSE   Date/Time Value Ref Range Status   03/20/2017 0754 221 (H) 70 - 130 mg/dL Final   03/19/2017 2038 186 (H) 70 - 130 mg/dL Final   03/19/2017 1725 132 (H) 70 - 130 mg/dL Final   03/19/2017 1212 170 (H) 70 - 130 mg/dL Final   03/19/2017 0728 128 70 - 130 mg/dL Final   03/18/2017 2225 186 (H) 70 - 130 mg/dL Final   03/18/2017 1722 143 (H) 70 - 130 mg/dL Final   03/18/2017 1209 175 (H) 70 - 130 mg/dL Final           arformoterol 15 mcg Nebulization BID - RT   aspirin 81 mg Oral Daily   budesonide 0.5 mg Nebulization BID - RT   carbidopa-levodopa 1 tablet Per G Tube Q6H   diltiaZEM 60 mg Per G Tube TID   enoxaparin 30 mg Subcutaneous Daily   famotidine 10 mg Per G Tube BID   guaiFENesin 600 mg Oral BID   insulin aspart  0-7 Units Subcutaneous 4x Daily AC & at Bedtime   insulin detemir 20 Units Subcutaneous BID   ipratropium-albuterol 3 mL Nebulization Q4H - RT   lanthanum 500 mg Oral TID With Meals   metoprolol tartrate 25 mg Per G Tube BID   piperacillin-tazobactam 3.375 g Intravenous Q8H   polyethylene glycol 17 g Per G Tube Daily   primidone 50 mg Oral BID   vancomycin 2,000 mg Intravenous Q24H       Pharmacy to dose vancomycin    Diet Regular; Consistent Carbohydrate, Cardiac      Assessment/Plan   Assessment:     Active Hospital Problems (** Indicates Principal Problem)    Diagnosis Date Noted   • **Healthcare associated bacterial pneumonia - covering for gram-negative and MRSA organisms [J15.9] 03/17/2017   • Acute kidney injury [N17.9] 03/17/2017   • Morbid obesity with BMI of 50.0-59.9, adult [E66.01, Z68.43] 03/17/2017   • Anoxic encephalopathy [G93.1] 03/17/2017   • Essential tremor [G25.0] 03/17/2017   • Acute on chronic respiratory failure with hypoxia [J96.21] 03/16/2017   • Tracheostomy in place [Z93.0] 03/16/2017   • Hepatitis C [B19.20] 03/16/2017   • Type 2 diabetes mellitus without complication [E11.9] 03/16/2017   • Sleep apnea, obstructive [G47.33] 03/16/2017   • Hypertension [I10] 03/16/2017   • Atrial fibrillation [I48.91] 03/16/2017   • Chronic diastolic congestive heart failure [I50.32] 03/16/2017      Resolved Hospital Problems    Diagnosis Date Noted Date Resolved   No resolved problems to display.       Plan:   - Admitted to monitored unit  - Continue ZOSYN and VANCOMYCIN covering for nosocomial exposures.  - Cultures negative.  Discontinue vancomycin?  - Supplemental oxygen to keep SaO2 > 92%  - Pulmonary consulted.  Help appreciated.  - Neurology following for tremor.  Mysoline started.  Sinemet also started for parkinsonian-type symptoms.  - Physical therapy consult    ** Discharge when okay with pulmonology.      Tian Caicedo MD  [facility]  03/20/17  10:39 AM

## 2017-03-20 NOTE — PROGRESS NOTES
Acute Care - Physical Therapy Treatment Note  [facility]     Patient Name: Lucy Sevilla  : 1960  MRN: [id number]  Today's Date: 3/20/2017  Onset of Illness/Injury or Date of Surgery Date: 17  Date of Referral to PT: 17  Referring Physician: Xochilt    Admit Date: 3/16/2017    Visit Dx:    ICD-10-CM ICD-9-CM   1. Acute on chronic respiratory failure with hypoxia J96.21 518.84     799.02   2. HAP (hospital-acquired pneumonia) J18.9 486   3. Generalized weakness R53.1 780.79     Patient Active Problem List   Diagnosis   • Acute on chronic respiratory failure with hypoxia   • Tracheostomy in place   • Hepatitis C   • Type 2 diabetes mellitus without complication   • Hyperlipidemia   • Sleep apnea, obstructive   • Hypertension   • Atrial fibrillation   • Chronic diastolic congestive heart failure   • Healthcare associated bacterial pneumonia - covering for gram-negative and MRSA organisms   • Acute kidney injury   • Morbid obesity with BMI of 50.0-59.9, adult   • Anoxic encephalopathy   • Essential tremor               Adult Rehabilitation Note       17 1337          Rehab Assessment/Intervention    Discipline physical therapy assistant  -      Document Type therapy note (daily note)  -      Subjective Information agree to therapy;complains of;weakness;fatigue;pain;numbness  -      Precautions/Limitations oxygen therapy device and L/min   3L nc, also as trach  -      Recorded by [JM] Connie Emmanuel PTA      Pain Assessment    Pain Assessment 0-10  -      Pain Score 7  -      Pain Type Acute pain  -      Pain Location Leg  -      Pain Orientation Right  -      Pain Intervention(s) Repositioned  -      Recorded by [JM] Connie Emmanuel PTA      Bed Mobility, Assessment/Treatment    Bed Mobility, Assistive Device bed rails  -      Bed Mobility, Roll Right, [place] moderate assist (50% patient effort);maximum assist (25% patient effort)  -      Bed Mob, Supine to Sit,  [place] maximum assist (25% patient effort);2 person assist required  -JM      Bed Mob, Sit to Supine, [place] maximum assist (25% patient effort);2 person assist required  -      Recorded by [JEAN] Connie Emmanuel PTA      Transfer Assessment/Treatment    Transfers, Sit-Stand [place] maximum assist (25% patient effort);2 person assist required  -      Transfers, Stand-Sit [place] moderate assist (50% patient effort);2 person assist required  -      Transfers, Sit-Stand-Sit, Assist Device rolling walker  -      Transfer, Comment improved stance second attempt  -      Recorded by [JEAN] Connie Emmanuel PTA      Therapy Exercises    Bilateral Lower Extremities AROM:;10 reps;quad sets;glut sets;heel slides;hip abduction/adduction;supine  -      Recorded by [JEAN] Connie Emmanuel PTA      Positioning and Restraints    Pre-Treatment Position in bed  -      In Bed side lying right;call light within reach;encouraged to call for assist;exit alarm on  -      Recorded by [JEAN] Connie Emmanuel PTA        User Key  (r) = Recorded By, (t) = Taken By, (c) = Cosigned By    Initials Name Effective Dates    JEAN Emmanuel PTA 02/18/16 -                 IP PT Goals       03/16/17 1038          Bed Mobility PT LTG    Bed Mobility PT LTG, Date Established 03/16/17  -MQ      Bed Mobility PT LTG, Time to Achieve 1 wk  -MQ      Bed Mobility PT LTG, Activity Type all bed mobility  -MQ      Bed Mobility PT LTG, [place] Level moderate assist (50% patient effort);2 person assist required  -MQ      Bed Mobility PT Goal  LTG, Assist Device bed rails  -MQ      Dynamic Sitting Balance PT LTG    Dynamic Sitting Balance PT LTG, Date Established 03/16/17  -MQ      Dynamic Sitting Balance PT LTG, Time to Achieve 1 wk  -MQ      Dynamic Sitting Balance PT LTG, [place] Level supervision required  -MQ      Dynamic Sitting Balance PT LTG, Assist Device UE Support  -MQ        User Key  (r) = Recorded By, (t)  = Taken By, (c) = Cosigned By    Initials Name Provider Type     Eleanor Summers, PT Physical Therapist          Physical Therapy Education     Title: PT OT SLP Therapies (Active)     Topic: Physical Therapy (Active)     Point: Mobility training (Active)    Learning Progress Summary    Learner Readiness Method Response Comment Documented by Status   Patient Acceptance E,TB,D NR   03/20/17 1627 Active    Acceptance E,D NR,DU   03/16/17 1037 Done               Point: Home exercise program (Active)    Learning Progress Summary    Learner Readiness Method Response Comment Documented by Status   Patient Acceptance E,TB,D NR   03/20/17 1627 Active               Point: Body mechanics (Active)    Learning Progress Summary    Learner Readiness Method Response Comment Documented by Status   Patient Acceptance E,TB,D NR   03/20/17 1627 Active    Acceptance E,D NR,DU   03/16/17 1037 Done               Point: Precautions (Active)    Learning Progress Summary    Learner Readiness Method Response Comment Documented by Status   Patient Acceptance E,TB,D NR   03/20/17 1627 Active                      User Key     Initials Effective Dates Name Provider Type Discipline     02/18/16 -  Connie Emmanuel PTA Physical Therapy Assistant PT     12/11/15 -  Eleanor Summers, PT Physical Therapist PT                    PT Recommendation and Plan  Anticipated Equipment Needs At Discharge:  (none)  Anticipated Discharge Disposition: extended care facility  Planned Therapy Interventions: balance training, bed mobility training, home exercise program, patient/family education, strengthening  PT Frequency: 2-3 times/wk  Plan of Care Review  Plan Of Care Reviewed With: patient  Progress: progress toward functional goals is gradual  Outcome Summary/Follow up Plan: pt esdras standing x2, second attempt improved ; unable to take steps          Outcome Measures       03/20/17 1600          How much help from another person do you currently  need...    Turning from your back to your side while in flat bed without using bedrails? 2  -JM      Moving from lying on back to sitting on the side of a flat bed without bedrails? 2  -JM      Moving to and from a bed to a chair (including a wheelchair)? 1  -JM      Standing up from a chair using your arms (e.g., wheelchair, bedside chair)? 2  -JM      Climbing 3-5 steps with a railing? 1  -JM      To walk in hospital room? 1  -JM      AM-PAC 6 Clicks Score 9  -        User Key  (r) = Recorded By, (t) = Taken By, (c) = Cosigned By    Initials Name Provider Type    JEAN Emmanuel PTA Physical Therapy Assistant           Time Calculation:         PT Charges       03/20/17 1629          Time Calculation    Start Time 1320  -JEAN      Stop Time 1351  -JEAN      Time Calculation (min) 31 min  -JEAN      PT Received On 03/20/17  -JEAN      PT - Next Appointment 03/21/17  -JEAN        User Key  (r) = Recorded By, (t) = Taken By, (c) = Cosigned By    Initials Name Provider Type    JEAN Emmanuel PTA Physical Therapy Assistant          Therapy Charges for Today     Code Description Service Date Service Provider Modifiers Qty    24992296003 HC PT THER PROC EA 15 MIN 3/20/2017 Connie Emmanuel PTA GP 2    18502350211 HC PT THER SUPP EA 15 MIN 3/20/2017 Connie Emmanuel PTA GP 1          PT G-Codes  Outcome Measure Options: AM-PAC 6 Clicks Basic Mobility (PT)    Connie Emmanuel PTA  3/20/2017

## 2017-03-20 NOTE — PROGRESS NOTES
Daily Progress Note.     Lucy Sevilla  56 y.o.  female  3/20/2017    Brief problem (summary)  This is a 56-year-old female with a complicated medical history. Normally she is cared by Dr. Hall at [facility] for her pulmonary problems and sleep apnea. She has been in and out of the hospital many times and this is #4 trach. Recently he did a bronchoscopy and told her that there is a skin overgrowth that needs to be lasered. He was planning to send her to [facility] for laser procedure but she ended up in [facility]. While she was in [place] she had a CODE BLUE and was resuscitated. The boyfriend reports that after the code patient developed this tremors. She was sent to a rehabilitation and she has been in the rehabilitation for 4 weeks when she found to have increasing lung condition, cough requiring 15 L of oxygen by trach mask and sent to the emergency room. The chest x-ray shows infiltrate consistent pneumonia and she has been started on Zosyn and vancomycin. Patient unable to give me much history unfortunately. She has a past medical history of sleep apnea, chronic hypoxic respiratory failure, atrial fibrillation, CHF, diabetes mellitus, hepatitis C, hypertension and hyperlipidemia.    Today, looks lot better    PMS/FH/SH: reviewed and unchanged.  Medications:     arformoterol 15 mcg Nebulization BID - RT   aspirin 81 mg Oral Daily   budesonide 0.5 mg Nebulization BID - RT   carbidopa-levodopa 1 tablet Per G Tube Q6H   diltiaZEM 60 mg Per G Tube TID   enoxaparin 30 mg Subcutaneous Daily   famotidine 10 mg Per G Tube BID   guaiFENesin 600 mg Oral BID   insulin aspart 0-7 Units Subcutaneous 4x Daily AC & at Bedtime   insulin detemir 20 Units Subcutaneous BID   ipratropium-albuterol 3 mL Nebulization Q4H - RT   lanthanum 500 mg Oral TID With Meals   metoprolol tartrate 25 mg Per G Tube BID   piperacillin-tazobactam 3.375 g Intravenous Q8H   polyethylene glycol 17 g Per G Tube Daily   primidone 50 mg  Oral BID   vancomycin 2,000 mg Intravenous Q24H       Pharmacy to dose vancomycin        ROS:  Respiratory ROS: NA    Objective:  Temp:  [98 °F (36.7 °C)-98.3 °F (36.8 °C)] 98 °F (36.7 °C)  I/O last 3 completed shifts:  In: -   Out: 200 [Urine:200]  I/O this shift:  In: 100 [NG/GT:100]  Out: -     Physical Exam   Constitutional: She is oriented to person, place, and time. She appears well-developed and well-nourished.   HENT:   Head: Atraumatic.   Eyes: Pupils are equal, round, and reactive to light. No scleral icterus.   Neck:   Trach   Cardiovascular: Normal rate and regular rhythm.    Pulmonary/Chest: No accessory muscle usage. No respiratory distress. She has decreased breath sounds. She has no wheezes.   Abdominal: Soft. Normal appearance and bowel sounds are normal. She exhibits no ascites. There is no hepatosplenomegaly.   Neurological: She is alert and oriented to person, place, and time.   Skin: No laceration and no petechiae noted. No cyanosis. Nails show no clubbing.   Psychiatric: She has a normal mood and affect. Her behavior is normal.   Vitals reviewed.      Results from last 7 days  Lab Units 03/19/17  0719 03/18/17  0443 03/17/17  0600   WBC 10*3/mm3 6.05 8.20 6.89   HEMOGLOBIN g/dL 10.5* 10.2* 10.7*   HEMATOCRIT % 33.3* 32.1* 33.9*   PLATELETS 10*3/mm3 191 185 214       Results from last 7 days  Lab Units 03/19/17  0719 03/18/17  0443 03/17/17  0600   SODIUM mmol/L 139 142 140   POTASSIUM mmol/L 4.4 4.0 4.3   CHLORIDE mmol/L 100 100 98   TOTAL CO2 mmol/L 25.5 28.2 25.8   BUN mg/dL 35* 41* 45*   CREATININE mg/dL 1.43* 2.15* 1.79*   GLUCOSE mg/dL 137* 147* 140*   CALCIUM mg/dL 9.2 9.0 9.5           Results from last 7 days  Lab Units 03/18/17  2123   PH, ARTERIAL pH units 7.400   PO2 ART mm Hg 111.6*   PCO2, ARTERIAL mm Hg 51.6*   HCO3 ART mmol/L 32.0*       ASSESSMENT/ PLAN:  · Acute on chronic hypoxic respiratory failure, patient is requiring more oxygen than normal. Continue on weaning the  oxygen.  · Healthcare associated pneumonia, I agree with antibiotics choice. Patient is being in the hospital, nursing homes for most part of the year.  · Tremors,  the boyfriend states that these started after the CODE BLUE at [facility]. I will ask neurology to see her and assist  · History of sleep apnea, she has tracheostomy and does not require any positive pressures .  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  [facility]    3/20/2017    EMR Dragon/Transcription disclaimer:   Much of this encounter note is an electronic transcription/translation of spoken language to printed text. The electronic translation of spoken language may permit erroneous, or at times, nonsensical words or phrases to be inadvertently transcribed; Although I have reviewed the note for such errors, some may still exist.

## 2017-03-20 NOTE — PLAN OF CARE
Problem: Patient Care Overview (Adult)  Goal: Plan of Care Review  Outcome: Ongoing (interventions implemented as appropriate)    03/20/17 0455   Coping/Psychosocial Response Interventions   Plan Of Care Reviewed With patient   Patient Care Overview   Progress progress toward functional goals as expected   Outcome Evaluation   Outcome Summary/Follow up Plan Suctioned x 2 and trach care during the night Cough and breath sounds are improving. VSS. Continue to monitor.        Goal: Discharge Needs Assessment  Outcome: Ongoing (interventions implemented as appropriate)    Problem: Respiratory Insufficiency (Adult)  Goal: Acid/Base Balance  Outcome: Ongoing (interventions implemented as appropriate)  Goal: Effective Ventilation  Outcome: Ongoing (interventions implemented as appropriate)    Problem: Skin Integrity Impairment, Risk/Actual (Adult)  Goal: Skin Integrity/Wound Healing  Outcome: Ongoing (interventions implemented as appropriate)    Problem: Fall Risk (Adult)  Goal: Identify Related Risk Factors and Signs and Symptoms  Outcome: Ongoing (interventions implemented as appropriate)  Goal: Absence of Falls  Outcome: Ongoing (interventions implemented as appropriate)

## 2017-03-20 NOTE — PROGRESS NOTES
"Continued Stay Note  [facility]     Patient Name: Lucy Sevilla  MRN: [id number]  Today's Date: 3/20/2017    Admit Date: 3/16/2017          Discharge Plan       03/20/17 1631    Case Management/Social Work Plan    Plan Disposition unknown- family would like patient moved to [facility].  If unable to locate one, will return to [place]    Patient/Family In Agreement With Plan yes    Additional Comments At length discussion with Jonathan Thiago ( significant other/POA) in room.  He has multiple concerns.  He is wanting the patient to see someone about a growth in her trachea, but pulmonary has already consulted an ENT to see the patient.  He then showed me a letter that stated that the patient's Passport would terminate on 03/31/17.  I called and spoke with Aide/Dori and patient  is being to changed to [facility] medicaid per Medicaid rules.  She said this has been explained to both patient and Mr. Das.  I explained this to  them both as well .  Their main concern was that the government would \"take her check.\"  I did explain to them that her income would be taken  to help pay the nursing home for her care.  Jonathan also stated that he would like  to see if patient could be relocated to a facility in the [facility].  I explained that I would try, but she has a high acuity level and if nothing can be found, she may need to return to [facility].  He verbalized understanding.  Call placed to [facility] and her facilities can not accept.   Jayla consulted and [facility] is looking.  Nell is checking to see if any of her [facility] facilities can consider.  CCP will follow.               Discharge Codes     None            Kaleigh Mendoza RN    "

## 2017-03-21 LAB
BACTERIA SPEC AEROBE CULT: NORMAL
BACTERIA SPEC AEROBE CULT: NORMAL
GLUCOSE BLDC GLUCOMTR-MCNC: 126 MG/DL (ref 70–130)
GLUCOSE BLDC GLUCOMTR-MCNC: 135 MG/DL (ref 70–130)
GLUCOSE BLDC GLUCOMTR-MCNC: 154 MG/DL (ref 70–130)

## 2017-03-21 PROCEDURE — 25010000002 PIPERACILLIN SOD-TAZOBACTAM PER 1 G: Performed by: HOSPITALIST

## 2017-03-21 PROCEDURE — 82962 GLUCOSE BLOOD TEST: CPT

## 2017-03-21 PROCEDURE — 94799 UNLISTED PULMONARY SVC/PX: CPT

## 2017-03-21 PROCEDURE — 25010000002 VANCOMYCIN: Performed by: HOSPITALIST

## 2017-03-21 PROCEDURE — 25010000002 ENOXAPARIN PER 10 MG: Performed by: HOSPITALIST

## 2017-03-21 RX ADMIN — CARBIDOPA AND LEVODOPA 1 TABLET: 25; 100 TABLET ORAL at 23:44

## 2017-03-21 RX ADMIN — ARFORMOTEROL TARTRATE 15 MCG: 15 SOLUTION RESPIRATORY (INHALATION) at 07:39

## 2017-03-21 RX ADMIN — GUAIFENESIN 600 MG: 600 TABLET, EXTENDED RELEASE ORAL at 08:28

## 2017-03-21 RX ADMIN — BUDESONIDE 0.5 MG: 0.5 INHALANT RESPIRATORY (INHALATION) at 07:39

## 2017-03-21 RX ADMIN — LANTHANUM CARBONATE 500 MG: 500 TABLET, CHEWABLE ORAL at 17:46

## 2017-03-21 RX ADMIN — LANTHANUM CARBONATE 500 MG: 500 TABLET, CHEWABLE ORAL at 11:40

## 2017-03-21 RX ADMIN — ASPIRIN 81 MG: 81 TABLET ORAL at 08:28

## 2017-03-21 RX ADMIN — METOPROLOL TARTRATE 25 MG: 25 TABLET ORAL at 17:46

## 2017-03-21 RX ADMIN — VANCOMYCIN HYDROCHLORIDE 2000 MG: 1 INJECTION, POWDER, LYOPHILIZED, FOR SOLUTION INTRAVENOUS at 11:31

## 2017-03-21 RX ADMIN — ARFORMOTEROL TARTRATE 15 MCG: 15 SOLUTION RESPIRATORY (INHALATION) at 21:30

## 2017-03-21 RX ADMIN — PRIMIDONE 50 MG: 50 TABLET ORAL at 08:29

## 2017-03-21 RX ADMIN — FAMOTIDINE 10 MG: 20 TABLET, FILM COATED ORAL at 17:46

## 2017-03-21 RX ADMIN — LANTHANUM CARBONATE 500 MG: 500 TABLET, CHEWABLE ORAL at 08:29

## 2017-03-21 RX ADMIN — CARBIDOPA AND LEVODOPA 1 TABLET: 25; 100 TABLET ORAL at 05:43

## 2017-03-21 RX ADMIN — ENOXAPARIN SODIUM 30 MG: 30 INJECTION SUBCUTANEOUS at 08:29

## 2017-03-21 RX ADMIN — CARBIDOPA AND LEVODOPA 1 TABLET: 25; 100 TABLET ORAL at 17:46

## 2017-03-21 RX ADMIN — METOPROLOL TARTRATE 25 MG: 25 TABLET ORAL at 08:28

## 2017-03-21 RX ADMIN — FAMOTIDINE 10 MG: 20 TABLET, FILM COATED ORAL at 08:32

## 2017-03-21 RX ADMIN — TAZOBACTAM SODIUM AND PIPERACILLIN SODIUM 3.38 G: 375; 3 INJECTION, SOLUTION INTRAVENOUS at 23:45

## 2017-03-21 RX ADMIN — POLYETHYLENE GLYCOL 3350 17 G: 17 POWDER, FOR SOLUTION ORAL at 08:29

## 2017-03-21 RX ADMIN — INSULIN DETEMIR 20 UNITS: 100 INJECTION, SOLUTION SUBCUTANEOUS at 17:47

## 2017-03-21 RX ADMIN — TAZOBACTAM SODIUM AND PIPERACILLIN SODIUM 3.38 G: 375; 3 INJECTION, SOLUTION INTRAVENOUS at 05:42

## 2017-03-21 RX ADMIN — GUAIFENESIN 600 MG: 600 TABLET, EXTENDED RELEASE ORAL at 17:46

## 2017-03-21 RX ADMIN — DILTIAZEM HYDROCHLORIDE 60 MG: 60 TABLET, FILM COATED ORAL at 17:46

## 2017-03-21 RX ADMIN — DILTIAZEM HYDROCHLORIDE 60 MG: 60 TABLET, FILM COATED ORAL at 08:28

## 2017-03-21 RX ADMIN — DILTIAZEM HYDROCHLORIDE 60 MG: 60 TABLET, FILM COATED ORAL at 23:44

## 2017-03-21 RX ADMIN — INSULIN DETEMIR 20 UNITS: 100 INJECTION, SOLUTION SUBCUTANEOUS at 08:32

## 2017-03-21 RX ADMIN — TRAMADOL HYDROCHLORIDE 50 MG: 50 TABLET, FILM COATED ORAL at 08:28

## 2017-03-21 RX ADMIN — CARBIDOPA AND LEVODOPA 1 TABLET: 25; 100 TABLET ORAL at 11:40

## 2017-03-21 RX ADMIN — PRIMIDONE 50 MG: 50 TABLET ORAL at 17:46

## 2017-03-21 RX ADMIN — BUDESONIDE 0.5 MG: 0.5 INHALANT RESPIRATORY (INHALATION) at 21:30

## 2017-03-21 RX ADMIN — TAZOBACTAM SODIUM AND PIPERACILLIN SODIUM 3.38 G: 375; 3 INJECTION, SOLUTION INTRAVENOUS at 14:56

## 2017-03-21 NOTE — PROGRESS NOTES
"      Name: Lucy Sevilla ADMIT: 3/16/2017   : 1960  PCP: Elyo Cancino MD    MRN: [id number] LOS: 5 days   AGE/SEX: 56 y.o. female  ROOM: [facility]     Subjective   Denies any specific complaints.    Objective   Vital Signs  Temp:  [97.4 °F (36.3 °C)-97.9 °F (36.6 °C)] 97.4 °F (36.3 °C)  Heart Rate:  [62-79] 75  Resp:  [16-24] 18  BP: (126-144)/(67-87) 144/87  SpO2:  [90 %-100 %] 98 %  on  Flow (L/min):  [3-8] 4;   O2 Device: tracheostomy collar  Body mass index is 54.49 kg/(m^2).    Intake/Output Summary (Last 24 hours) at 17 0844  Last data filed at 17   Gross per 24 hour   Intake    340 ml   Output      0 ml   Net    340 ml     Wt Readings from Last 1 Encounters:   17 0615 (!) 337 lb 9.6 oz (153 kg)   17 0634 (!) 322 lb (146 kg)   17 0159 (!) 374 lb (170 kg)     Wt Readings from Last 3 Encounters:   17 (!) 337 lb 9.6 oz (153 kg)       Objective:  General Appearance:  Comfortable and in no acute distress.    Vital signs: (most recent): Blood pressure 144/87, pulse 75, temperature 97.4 °F (36.3 °C), temperature source Oral, resp. rate 18, height 66\" (167.6 cm), weight 337 lb 9.6 oz (153 kg), SpO2 98 %.    HEENT: (Tracheostomy in place)    Lungs:  Normal effort.  There are decreased breath sounds.    Heart: Normal rate.  Regular rhythm.    Abdomen: Abdomen is soft and non-distended.  (Morbidly obese)Bowel sounds are normal.   There is no abdominal tenderness.     Extremities: There is no dependent edema.    Neurological: Patient is alert and oriented to person, place and time.  (Nonverbal).    Skin:  Warm and dry.        Results Review:       I reviewed the patient's new clinical results.    Results from last 7 days  Lab Units 17  0719 17  0443 17  0600 17  0644 17  0232   WBC 10*3/mm3 6.05 8.20 6.89 7.47 8.35   HEMOGLOBIN g/dL 10.5* 10.2* 10.7* 11.6* 11.7*   PLATELETS 10*3/mm3 191 185 214 217 266       Results from last 7 days  Lab Units " 03/19/17  0719 03/18/17  0443 03/17/17  0600 03/16/17  1540 03/16/17  0644 03/16/17  0232   SODIUM mmol/L 139 142 140  --  140 139   POTASSIUM mmol/L 4.4 4.0 4.3 4.4 4.8 4.5   CHLORIDE mmol/L 100 100 98  --  98 98   TOTAL CO2 mmol/L 25.5 28.2 25.8  --  28.1 28.0   BUN mg/dL 35* 41* 45*  --  53* 49*   CREATININE mg/dL 1.43* 2.15* 1.79*  --  2.28* 2.39*   GLUCOSE mg/dL 137* 147* 140*  --  131* 150*   Estimated Creatinine Clearance: 68.7 mL/min (by C-G formula based on Cr of 1.43).    Results from last 7 days  Lab Units 03/19/17  0719 03/18/17  0443 03/17/17  0600 03/16/17  1540 03/16/17  0644 03/16/17  0232   CALCIUM mg/dL 9.2 9.0 9.5  --  9.5 9.6   ALBUMIN g/dL  --   --   --   --   --  3.80   MAGNESIUM mg/dL  --   --   --  2.0  --   --        Results from last 7 days  Lab Units 03/19/17  0719 03/16/17  0348   PROCALCITONIN ng/mL 0.94*  --    LACTATE mmol/L  --  1.4   No results found for: STREPPNEUAG, LEGANTIGENUR    Results from last 7 days  Lab Units 03/17/17  0910 03/16/17  0419   BLOODCX   --  No growth at 5 days   RESPCX  Scant growth (1+) Normal Respiratory Bella  --    GRAM STAIN RESULT  Occasional WBCs seen  Occasional Mixed bacterial morphotypes seen on Gram Stain  --      GLUCOSE   Date/Time Value Ref Range Status   03/21/2017 0732 135 (H) 70 - 130 mg/dL Final   03/20/2017 2049 221 (H) 70 - 130 mg/dL Final   03/20/2017 1708 168 (H) 70 - 130 mg/dL Final   03/20/2017 1123 217 (H) 70 - 130 mg/dL Final   03/20/2017 0754 221 (H) 70 - 130 mg/dL Final   03/19/2017 2038 186 (H) 70 - 130 mg/dL Final   03/19/2017 1725 132 (H) 70 - 130 mg/dL Final   03/19/2017 1212 170 (H) 70 - 130 mg/dL Final           arformoterol 15 mcg Nebulization BID - RT   aspirin 81 mg Oral Daily   budesonide 0.5 mg Nebulization BID - RT   carbidopa-levodopa 1 tablet Per G Tube Q6H   diltiaZEM 60 mg Per G Tube TID   enoxaparin 30 mg Subcutaneous Daily   famotidine 10 mg Per G Tube BID   guaiFENesin 600 mg Oral BID   insulin aspart 0-7 Units  Subcutaneous 4x Daily AC & at Bedtime   insulin detemir 20 Units Subcutaneous BID   lanthanum 500 mg Oral TID With Meals   metoprolol tartrate 25 mg Per G Tube BID   piperacillin-tazobactam 3.375 g Intravenous Q8H   polyethylene glycol 17 g Per G Tube Daily   primidone 50 mg Oral BID   vancomycin 2,000 mg Intravenous Q24H       Pharmacy to dose vancomycin    Diet Regular; Consistent Carbohydrate, Cardiac      Assessment/Plan   Assessment:     Active Hospital Problems (** Indicates Principal Problem)    Diagnosis Date Noted   • **Healthcare associated bacterial pneumonia - covering for gram-negative and MRSA organisms [J15.9] 03/17/2017   • Acute kidney injury [N17.9] 03/17/2017   • Morbid obesity with BMI of 50.0-59.9, adult [E66.01, Z68.43] 03/17/2017   • Anoxic encephalopathy [G93.1] 03/17/2017   • Essential tremor [G25.0] 03/17/2017   • Acute on chronic respiratory failure with hypoxia [J96.21] 03/16/2017   • Tracheostomy in place [Z93.0] 03/16/2017   • Hepatitis C [B19.20] 03/16/2017   • Type 2 diabetes mellitus without complication [E11.9] 03/16/2017   • Sleep apnea, obstructive [G47.33] 03/16/2017   • Hypertension [I10] 03/16/2017   • Atrial fibrillation [I48.91] 03/16/2017   • Chronic diastolic congestive heart failure [I50.32] 03/16/2017      Resolved Hospital Problems    Diagnosis Date Noted Date Resolved   No resolved problems to display.       Plan:   - Continue ZOSYN and VANCOMYCIN covering for nosocomial exposures.  - Cultures negative.  Discontinue vancomycin?    ** Discharge when okay with pulmonology.      Tian Caicedo MD  [facility]  03/21/17  8:44 AM

## 2017-03-21 NOTE — SIGNIFICANT NOTE
03/21/17 1114   Rehab Treatment   Discipline physical therapy assistant   Treatment Not Performed patient unavailable for treatment  (Pt with nursing PT to check back in PM)   Recommendation   PT - Next Appointment 03/21/17

## 2017-03-21 NOTE — SIGNIFICANT NOTE
03/21/17 0919   Rehab Treatment   Discipline speech language pathologist   Rehab Evaluation   Evaluation Not Performed other (see comments)  (New consult received for PMV. Pt with PMV at bedside and reports independent use with valve. Please clarify if cecil ST services warranted.)

## 2017-03-21 NOTE — PLAN OF CARE
Problem: Patient Care Overview (Adult)  Goal: Plan of Care Review  Outcome: Ongoing (interventions implemented as appropriate)    03/21/17 1815   Coping/Psychosocial Response Interventions   Plan Of Care Reviewed With patient   Patient Care Overview   Progress no change         03/21/17 1815   Coping/Psychosocial Response Interventions   Plan Of Care Reviewed With patient   Patient Care Overview   Progress no change   Outcome Evaluation   Outcome Summary/Follow up Plan pt still waiting for ENT, poss d/c tomorrow. ac/hs no SSI today needed, trach care done at noon today, iv antibotics cont until  sees pt.        Goal: Adult Individualization and Mutuality  Outcome: Ongoing (interventions implemented as appropriate)  Goal: Discharge Needs Assessment  Outcome: Ongoing (interventions implemented as appropriate)    Problem: Respiratory Insufficiency (Adult)  Goal: Acid/Base Balance  Outcome: Ongoing (interventions implemented as appropriate)  Goal: Effective Ventilation  Outcome: Ongoing (interventions implemented as appropriate)    Problem: Skin Integrity Impairment, Risk/Actual (Adult)  Goal: Skin Integrity/Wound Healing  Outcome: Ongoing (interventions implemented as appropriate)    Problem: Fall Risk (Adult)  Goal: Absence of Falls  Outcome: Ongoing (interventions implemented as appropriate)

## 2017-03-21 NOTE — SIGNIFICANT NOTE
03/21/17 1409   Rehab Treatment   Discipline physical therapy assistant   Treatment Not Performed other (see comments)  (pt to be seen tomorrow per frequency)   Recommendation   PT - Next Appointment 03/22/17

## 2017-03-21 NOTE — PLAN OF CARE
Problem: Patient Care Overview (Adult)  Goal: Plan of Care Review  Outcome: Ongoing (interventions implemented as appropriate)    03/20/17 2020 03/21/17 0353   Coping/Psychosocial Response Interventions   Plan Of Care Reviewed With patient;spouse --    Patient Care Overview   Progress --  improving   Outcome Evaluation   Outcome Summary/Follow up Plan --  iv antibx, ac/hs, trach, peg, ent to see, began sinemet, plan d/c ltc       Goal: Adult Individualization and Mutuality  Outcome: Ongoing (interventions implemented as appropriate)  Goal: Discharge Needs Assessment  Outcome: Ongoing (interventions implemented as appropriate)    Problem: Respiratory Insufficiency (Adult)  Goal: Acid/Base Balance  Outcome: Ongoing (interventions implemented as appropriate)  Goal: Effective Ventilation  Outcome: Ongoing (interventions implemented as appropriate)    Problem: Skin Integrity Impairment, Risk/Actual (Adult)  Goal: Skin Integrity/Wound Healing  Outcome: Ongoing (interventions implemented as appropriate)    Problem: Fall Risk (Adult)  Goal: Identify Related Risk Factors and Signs and Symptoms  Outcome: Outcome(s) achieved Date Met:  03/21/17  Goal: Absence of Falls  Outcome: Ongoing (interventions implemented as appropriate)

## 2017-03-21 NOTE — PROGRESS NOTES
Daily Progress Note.     Lucy Sevilla  56 y.o.  female  3/21/2017    Brief problem (summary)  This is a 56-year-old female with a complicated medical history. Normally she is cared by Dr. Hall at [place] for her pulmonary problems and sleep apnea. She has been in and out of the hospital many times and this is #4 trach. Recently he did a bronchoscopy and told her that there is a skin overgrowth that needs to be lasered. He was planning to send her to [facility] for laser procedure but she ended up in [facility]. While she was in [place] she had a CODE BLUE and was resuscitated. The boyfriend reports that after the code patient developed this tremors. She was sent to a rehabilitation and she has been in the rehabilitation for 4 weeks when she found to have increasing lung condition, cough requiring 15 L of oxygen by trach mask and sent to the emergency room. The chest x-ray shows infiltrate consistent pneumonia and she has been started on Zosyn and vancomycin. Patient unable to give me much history unfortunately. She has a past medical history of sleep apnea, chronic hypoxic respiratory failure, atrial fibrillation, CHF, diabetes mellitus, hepatitis C, hypertension and hyperlipidemia.    Today, looks lot better    PMS/FH/SH: reviewed and unchanged.  Medications:     arformoterol 15 mcg Nebulization BID - RT   aspirin 81 mg Oral Daily   budesonide 0.5 mg Nebulization BID - RT   carbidopa-levodopa 1 tablet Per G Tube Q6H   diltiaZEM 60 mg Per G Tube TID   enoxaparin 30 mg Subcutaneous Daily   famotidine 10 mg Per G Tube BID   guaiFENesin 600 mg Oral BID   insulin aspart 0-7 Units Subcutaneous 4x Daily AC & at Bedtime   insulin detemir 20 Units Subcutaneous BID   lanthanum 500 mg Oral TID With Meals   metoprolol tartrate 25 mg Per G Tube BID   piperacillin-tazobactam 3.375 g Intravenous Q8H   polyethylene glycol 17 g Per G Tube Daily   primidone 50 mg Oral BID   vancomycin 2,000 mg Intravenous Q24H        Pharmacy to dose vancomycin        ROS:  Respiratory ROS: NA    Objective:  Temp:  [97.4 °F (36.3 °C)-97.9 °F (36.6 °C)] 97.9 °F (36.6 °C)  I/O last 3 completed shifts:  In: 440 [P.O.:240; Other:50; NG/GT:100; IV Piggyback:50]  Out: -   I/O this shift:  In: 110 [Other:110]  Out: -     Physical Exam   Constitutional: She is oriented to person, place, and time. She appears well-developed and well-nourished.   HENT:   Head: Atraumatic.   Eyes: Pupils are equal, round, and reactive to light. No scleral icterus.   Neck:   Trach   Cardiovascular: Normal rate and regular rhythm.    Pulmonary/Chest: No accessory muscle usage. No respiratory distress. She has decreased breath sounds. She has no wheezes.   Abdominal: Soft. Normal appearance and bowel sounds are normal. She exhibits no ascites. There is no hepatosplenomegaly.   Neurological: She is alert and oriented to person, place, and time.   Skin: No laceration and no petechiae noted. No cyanosis. Nails show no clubbing.   Psychiatric: She has a normal mood and affect. Her behavior is normal.   Vitals reviewed.      Results from last 7 days  Lab Units 03/19/17  0719 03/18/17  0443 03/17/17  0600   WBC 10*3/mm3 6.05 8.20 6.89   HEMOGLOBIN g/dL 10.5* 10.2* 10.7*   HEMATOCRIT % 33.3* 32.1* 33.9*   PLATELETS 10*3/mm3 191 185 214       Results from last 7 days  Lab Units 03/19/17  0719 03/18/17  0443 03/17/17  0600   SODIUM mmol/L 139 142 140   POTASSIUM mmol/L 4.4 4.0 4.3   CHLORIDE mmol/L 100 100 98   TOTAL CO2 mmol/L 25.5 28.2 25.8   BUN mg/dL 35* 41* 45*   CREATININE mg/dL 1.43* 2.15* 1.79*   GLUCOSE mg/dL 137* 147* 140*   CALCIUM mg/dL 9.2 9.0 9.5           Results from last 7 days  Lab Units 03/18/17  2123   PH, ARTERIAL pH units 7.400   PO2 ART mm Hg 111.6*   PCO2, ARTERIAL mm Hg 51.6*   HCO3 ART mmol/L 32.0*       ASSESSMENT/ PLAN:  · Acute on chronic hypoxic respiratory failure, patient is requiring more oxygen than normal. Continue on weaning the  oxygen.  · Healthcare associated pneumonia, I agree with antibiotics choice. Patient is being in the hospital, nursing homes for most part of the year.  · Tremors,  the boyfriend states that these started after the CODE BLUE at [facility]. I will ask neurology to see her and assist  · History of sleep apnea, she has tracheostomy and does not require any positive pressures .  · Diabetes mellitus  · History of congestive heart failure  · Renal failure, improving Cr  · History of atrial fibrillation  · Hx of hepatitis C   · Parkinsonisum: on sinemet  · Vocal cord cyst or growth, Consult ENT     I will ask Estermichele to take look and she if can laser it out     Karl Sousa MD,[facility]  Pulmonary, Critical Care and Sleep Medicine.  [facility]    3/21/2017    EMR Dragon/Transcription disclaimer:   Much of this encounter note is an electronic transcription/translation of spoken language to printed text. The electronic translation of spoken language may permit erroneous, or at times, nonsensical words or phrases to be inadvertently transcribed; Although I have reviewed the note for such errors, some may still exist.

## 2017-03-21 NOTE — PROGRESS NOTES
Continued Stay Note  [facility]     Patient Name: Lucy Sevilla  MRN: [id number]  Today's Date: 3/21/2017    Admit Date: 3/16/2017          Discharge Plan       03/21/17 1134    Case Management/Social Work Plan    Additional Comments Per Jayla with [facility], [facility] is unable to meet pt's needs. Nell with [facility] states her buildings are unable to accept [employer] insurance. NEENA Castillo updated. Current plan is return to [facility]...............ELIAZAR Caballero, CCP              Discharge Codes     None            Walker Ibarra RN

## 2017-03-22 VITALS
HEIGHT: 66 IN | HEART RATE: 91 BPM | OXYGEN SATURATION: 100 % | BODY MASS INDEX: 47.09 KG/M2 | SYSTOLIC BLOOD PRESSURE: 115 MMHG | DIASTOLIC BLOOD PRESSURE: 64 MMHG | WEIGHT: 293 LBS | RESPIRATION RATE: 20 BRPM | TEMPERATURE: 97.6 F

## 2017-03-22 PROBLEM — N17.9 ACUTE KIDNEY INJURY (HCC): Status: RESOLVED | Noted: 2017-03-17 | Resolved: 2017-03-22

## 2017-03-22 PROBLEM — J15.6 PNEUMONIA DUE TO GRAM-NEGATIVE BACTERIA (HCC): Status: ACTIVE | Noted: 2017-03-22

## 2017-03-22 PROBLEM — J96.21 ACUTE ON CHRONIC RESPIRATORY FAILURE WITH HYPOXIA (HCC): Status: RESOLVED | Noted: 2017-03-16 | Resolved: 2017-03-22

## 2017-03-22 PROBLEM — J15.6 PNEUMONIA DUE TO GRAM-NEGATIVE BACTERIA (HCC): Status: RESOLVED | Noted: 2017-03-22 | Resolved: 2017-03-22

## 2017-03-22 LAB
CREAT BLD-MCNC: 1.37 MG/DL (ref 0.57–1)
GFR SERPL CREATININE-BSD FRML MDRD: 40 ML/MIN/1.73
GLUCOSE BLDC GLUCOMTR-MCNC: 108 MG/DL (ref 70–130)
GLUCOSE BLDC GLUCOMTR-MCNC: 166 MG/DL (ref 70–130)
VANCOMYCIN TROUGH SERPL-MCNC: 22.9 MCG/ML (ref 5–20)

## 2017-03-22 PROCEDURE — 25010000002 PIPERACILLIN SOD-TAZOBACTAM PER 1 G: Performed by: HOSPITALIST

## 2017-03-22 PROCEDURE — 80202 ASSAY OF VANCOMYCIN: CPT | Performed by: HOSPITALIST

## 2017-03-22 PROCEDURE — 82565 ASSAY OF CREATININE: CPT | Performed by: HOSPITALIST

## 2017-03-22 PROCEDURE — 94799 UNLISTED PULMONARY SVC/PX: CPT

## 2017-03-22 PROCEDURE — 25010000002 ENOXAPARIN PER 10 MG: Performed by: HOSPITALIST

## 2017-03-22 PROCEDURE — 82962 GLUCOSE BLOOD TEST: CPT

## 2017-03-22 RX ORDER — AMOXICILLIN AND CLAVULANATE POTASSIUM 250; 62.5 MG/5ML; MG/5ML
500 POWDER, FOR SUSPENSION ORAL EVERY 8 HOURS SCHEDULED
Status: DISCONTINUED | OUTPATIENT
Start: 2017-03-22 | End: 2017-03-22 | Stop reason: HOSPADM

## 2017-03-22 RX ORDER — PRIMIDONE 50 MG/1
50 TABLET ORAL 2 TIMES DAILY
Start: 2017-03-22

## 2017-03-22 RX ORDER — AMOXICILLIN AND CLAVULANATE POTASSIUM 250; 62.5 MG/5ML; MG/5ML
500 POWDER, FOR SUSPENSION ORAL EVERY 8 HOURS SCHEDULED
Refills: 0
Start: 2017-03-22 | End: 2017-03-24

## 2017-03-22 RX ADMIN — POLYETHYLENE GLYCOL 3350 17 G: 17 POWDER, FOR SOLUTION ORAL at 09:34

## 2017-03-22 RX ADMIN — CARBIDOPA AND LEVODOPA 1 TABLET: 25; 100 TABLET ORAL at 13:05

## 2017-03-22 RX ADMIN — BUDESONIDE 0.5 MG: 0.5 INHALANT RESPIRATORY (INHALATION) at 09:57

## 2017-03-22 RX ADMIN — FAMOTIDINE 10 MG: 20 TABLET, FILM COATED ORAL at 09:35

## 2017-03-22 RX ADMIN — LANTHANUM CARBONATE 500 MG: 500 TABLET, CHEWABLE ORAL at 09:34

## 2017-03-22 RX ADMIN — TAZOBACTAM SODIUM AND PIPERACILLIN SODIUM 3.38 G: 375; 3 INJECTION, SOLUTION INTRAVENOUS at 06:52

## 2017-03-22 RX ADMIN — ASPIRIN 81 MG: 81 TABLET ORAL at 09:34

## 2017-03-22 RX ADMIN — ENOXAPARIN SODIUM 30 MG: 30 INJECTION SUBCUTANEOUS at 09:34

## 2017-03-22 RX ADMIN — ARFORMOTEROL TARTRATE 15 MCG: 15 SOLUTION RESPIRATORY (INHALATION) at 09:57

## 2017-03-22 RX ADMIN — GUAIFENESIN 600 MG: 600 TABLET, EXTENDED RELEASE ORAL at 09:34

## 2017-03-22 RX ADMIN — DILTIAZEM HYDROCHLORIDE 60 MG: 60 TABLET, FILM COATED ORAL at 09:34

## 2017-03-22 RX ADMIN — PRIMIDONE 50 MG: 50 TABLET ORAL at 09:34

## 2017-03-22 RX ADMIN — INSULIN DETEMIR 20 UNITS: 100 INJECTION, SOLUTION SUBCUTANEOUS at 09:35

## 2017-03-22 RX ADMIN — METOPROLOL TARTRATE 25 MG: 25 TABLET ORAL at 09:35

## 2017-03-22 NOTE — PROGRESS NOTES
"      Name: Lucy Sevilla ADMIT: 3/16/2017   : 1960  PCP: Eloy Cancino MD    MRN: [id number] LOS: 6 days   AGE/SEX: 56 y.o. female  ROOM: [facility]     Subjective   Denies any specific complaints.    Objective   Vital Signs  Temp:  [97.9 °F (36.6 °C)-98.2 °F (36.8 °C)] 98.2 °F (36.8 °C)  Heart Rate:  [60-99] 60  Resp:  [18-20] 20  BP: (110-144)/(57-87) 125/57  SpO2:  [95 %-100 %] 97 %  on   ;   O2 Device: tracheostomy collar  Body mass index is 54.49 kg/(m^2).    Intake/Output Summary (Last 24 hours) at 17 0723  Last data filed at 17 0421   Gross per 24 hour   Intake              110 ml   Output              200 ml   Net              -90 ml     Wt Readings from Last 1 Encounters:   17 0615 (!) 337 lb 9.6 oz (153 kg)   17 0634 (!) 322 lb (146 kg)   17 0159 (!) 374 lb (170 kg)     Wt Readings from Last 3 Encounters:   17 (!) 337 lb 9.6 oz (153 kg)       Objective:  General Appearance:  Comfortable and in no acute distress.    Vital signs: (most recent): Blood pressure 125/57, pulse 60, temperature 98.2 °F (36.8 °C), resp. rate 20, height 66\" (167.6 cm), weight (!) 337 lb 9.6 oz (153 kg), SpO2 97 %.    HEENT: (Tracheostomy in place)    Lungs:  Normal effort.  There are decreased breath sounds.    Heart: Normal rate.  Regular rhythm.    Abdomen: Abdomen is soft and non-distended.  (Morbidly obese)Bowel sounds are normal.   There is no abdominal tenderness.     Extremities: There is no dependent edema.    Neurological: Patient is alert and oriented to person, place and time.  (Nonverbal).    Skin:  Warm and dry.          Results Review:       I reviewed the patient's new clinical results.    Results from last 7 days  Lab Units 17  0719 17  0443 17  0600 17  0644 17  0232   WBC 10*3/mm3 6.05 8.20 6.89 7.47 8.35   HEMOGLOBIN g/dL 10.5* 10.2* 10.7* 11.6* 11.7*   PLATELETS 10*3/mm3 191 185 214 217 266       Results from last 7 days  Lab Units " 03/19/17  0719 03/18/17  0443 03/17/17  0600 03/16/17  1540 03/16/17  0644 03/16/17  0232   SODIUM mmol/L 139 142 140  --  140 139   POTASSIUM mmol/L 4.4 4.0 4.3 4.4 4.8 4.5   CHLORIDE mmol/L 100 100 98  --  98 98   TOTAL CO2 mmol/L 25.5 28.2 25.8  --  28.1 28.0   BUN mg/dL 35* 41* 45*  --  53* 49*   CREATININE mg/dL 1.43* 2.15* 1.79*  --  2.28* 2.39*   GLUCOSE mg/dL 137* 147* 140*  --  131* 150*   Estimated Creatinine Clearance: 68.7 mL/min (by C-G formula based on Cr of 1.43).    Results from last 7 days  Lab Units 03/19/17  0719 03/18/17  0443 03/17/17  0600 03/16/17  1540 03/16/17  0644 03/16/17  0232   CALCIUM mg/dL 9.2 9.0 9.5  --  9.5 9.6   ALBUMIN g/dL  --   --   --   --   --  3.80   MAGNESIUM mg/dL  --   --   --  2.0  --   --        Results from last 7 days  Lab Units 03/19/17  0719 03/16/17  0348   PROCALCITONIN ng/mL 0.94*  --    LACTATE mmol/L  --  1.4   No results found for: STREPPNEUAG, LEGANTIGENUR    Results from last 7 days  Lab Units 03/17/17  0910 03/16/17  0419   BLOODCX   --  No growth at 5 days   RESPCX  Scant growth (1+) Normal Respiratory Bella  --    GRAM STAIN RESULT  Occasional WBCs seen  Occasional Mixed bacterial morphotypes seen on Gram Stain  --      Glucose   Date/Time Value Ref Range Status   03/22/2017 0021 166 (H) 70 - 130 mg/dL Final   03/21/2017 1712 126 70 - 130 mg/dL Final   03/21/2017 1149 154 (H) 70 - 130 mg/dL Final   03/21/2017 0732 135 (H) 70 - 130 mg/dL Final   03/20/2017 2049 221 (H) 70 - 130 mg/dL Final   03/20/2017 1708 168 (H) 70 - 130 mg/dL Final   03/20/2017 1123 217 (H) 70 - 130 mg/dL Final   03/20/2017 0754 221 (H) 70 - 130 mg/dL Final           arformoterol 15 mcg Nebulization BID - RT   aspirin 81 mg Oral Daily   budesonide 0.5 mg Nebulization BID - RT   carbidopa-levodopa 1 tablet Per G Tube Q6H   diltiaZEM 60 mg Per G Tube TID   enoxaparin 30 mg Subcutaneous Daily   famotidine 10 mg Per G Tube BID   guaiFENesin 600 mg Oral BID   insulin aspart 0-7 Units  Subcutaneous 4x Daily AC & at Bedtime   insulin detemir 20 Units Subcutaneous BID   lanthanum 500 mg Oral TID With Meals   metoprolol tartrate 25 mg Per G Tube BID   piperacillin-tazobactam 3.375 g Intravenous Q8H   polyethylene glycol 17 g Per G Tube Daily   primidone 50 mg Oral BID   vancomycin 2,000 mg Intravenous Q24H       Pharmacy to dose vancomycin    Diet Regular; Consistent Carbohydrate, Cardiac      Assessment/Plan   Assessment:     Active Hospital Problems (** Indicates Principal Problem)    Diagnosis Date Noted   • **Healthcare associated bacterial pneumonia - covering for gram-negative and MRSA organisms [J15.9] 03/17/2017   • Acute kidney injury [N17.9] 03/17/2017   • Morbid obesity with BMI of 50.0-59.9, adult [E66.01, Z68.43] 03/17/2017   • Anoxic encephalopathy [G93.1] 03/17/2017   • Essential tremor [G25.0] 03/17/2017   • Acute on chronic respiratory failure with hypoxia [J96.21] 03/16/2017   • Tracheostomy in place [Z93.0] 03/16/2017   • Hepatitis C [B19.20] 03/16/2017   • Type 2 diabetes mellitus without complication [E11.9] 03/16/2017   • Sleep apnea, obstructive [G47.33] 03/16/2017   • Hypertension [I10] 03/16/2017   • Atrial fibrillation [I48.91] 03/16/2017   • Chronic diastolic congestive heart failure [I50.32] 03/16/2017      Resolved Hospital Problems    Diagnosis Date Noted Date Resolved   No resolved problems to display.       Plan:   - Discussed with Dr. Sousa.  Will discontinue IV antibiotics and transitioned to Augmentin.  Will complete a 7 day course of treatment.  - Dr. Latif to evaluate regarding vocal cord abnormality.  This could be followed up outpatient.  I have tried reaching patient's guardian to further discuss plan of care.      Tian Caicedo MD  [facility]  03/22/17  7:23 AM

## 2017-03-22 NOTE — DISCHARGE SUMMARY
NAME: Lucy Sevilla ADMIT: 3/16/2017   : 1960  PCP: Eloy Cancino MD    MRN: [id number] LOS: 6 days   AGE/SEX: 56 y.o. female  ROOM: [facility]     Date of Admission:  3/16/2017  Date of Discharge:  3/22/2017    PCP: Eloy Cancino MD    CHIEF COMPLAINT  Shortness of Breath      DISCHARGE DIAGNOSIS  Active Hospital Problems (** Indicates Principal Problem)    Diagnosis Date Noted   • **Pneumonia due to Gram-negative bacteria [J15.6] 2017   • Acute kidney injury [N17.9] 2017   • Morbid obesity with BMI of 50.0-59.9, adult [E66.01, Z68.43] 2017   • Anoxic encephalopathy [G93.1] 2017   • Essential tremor [G25.0] 2017   • Acute on chronic respiratory failure with hypoxia [J96.21] 2017   • Tracheostomy in place [Z93.0] 2017   • Hepatitis C [B19.20] 2017   • Type 2 diabetes mellitus without complication [E11.9] 2017   • Sleep apnea, obstructive [G47.33] 2017   • Hypertension [I10] 2017   • Atrial fibrillation [I48.91] 2017   • Chronic diastolic congestive heart failure [I50.32] 2017      Resolved Hospital Problems    Diagnosis Date Noted Date Resolved   No resolved problems to display.       SECONDARY DIAGNOSES  Past Medical History:   Diagnosis Date   • Anemia    • Atrial fibrillation    • CHF (congestive heart failure)    • Diabetes mellitus    • Hepatitis C    • Hyperlipidemia    • Hypertension    • Renal disorder    • Sleep apnea, obstructive    • Tracheostomy in place        CONSULTS   Consults     Date and Time Order Name Status Description    3/20/2017 1156 Inpatient Consult to ENT      3/20/2017 1147 Inpatient Consult to ENT      3/16/2017 1705 Inpatient Consult to Neurology Completed     3/16/2017 0551 Inpatient Consult to Pulmonology Completed     3/16/2017 0356 [facility] (on-call MD unless specified) Completed           PROCEDURES PERFORMED  None.      HOSPITAL COURSE  Patient is a 56 y.o. female presented to [facility]  [facility] complaining of SOA and cough.  Please see the admitting history and physical for further details.  She was seen by pulmonology who felt she had healthcare associated pneumonia.  She is given empiric Zosyn and vancomycin.  Cultures were negative.  Based on nosocomial exposures it was decided to continue treatment for presumed gram-negative pneumonia.  Dr. Sousa has discontinued vancomycin and suggested 2 additional days of Augmentin to complete a seven-day course of treatment.  Her respiratory status is back to baseline.  She has a known growth on her vocal cord that had prior arrangements made to be followed to [facility] [place].  She'll be given information to follow up with Dr. Santana Latif if she desires to change to a pulmonologist here at [facility].  I discussed with Dr. Latif he said this could be repaired electively as outpatient when she is recovered from her pneumonia.  She's had no other competitions during her stay here and appears to be stable for discharge back to rehab.     She was seen by neurology due to presence of tremor and some rigidity.  She was started on primidone and Sinemet with some improvement of symptoms.  We will continue these post discharge.        VITAL SIGNS  /64 (BP Location: Right arm, Patient Position: Lying)  Pulse 91  Temp 97.6 °F (36.4 °C) (Oral)   Resp 20  Ht 66\" (167.6 cm)  Wt (!) 337 lb 9.6 oz (153 kg)  SpO2 100%  BMI 54.49 kg/m2  Objective   HEENT: (Tracheostomy in place)   Lungs: Normal effort. There are decreased breath sounds.   Heart: Normal rate. Regular rhythm.   Abdomen: Abdomen is soft and non-distended. (Morbidly obese)Bowel sounds are normal. There is no abdominal tenderness.   Extremities: There is no dependent edema.   Neurological: Patient is alert and oriented to person, place and time. (Nonverbal).   Skin: Warm and dry.       CONDITION ON DISCHARGE  Stable.      DISCHARGE DISPOSITION   Skilled Nursing Facility " (DC - External)      DISCHARGE MEDICATIONS   Lucy Sevilla   Home Medication Instructions SAMUEL:[id number]    Printed on:03/22/17 0270   Medication Information                      acetaminophen (TYLENOL) 325 MG tablet  650 mg by Per G Tube route Every 6 (Six) Hours As Needed for Mild Pain (1-3).             albuterol (ACCUNEB) 0.63 MG/3ML nebulizer solution  Take 1 ampule by nebulization Every 4 (Four) Hours As Needed for Wheezing.             albuterol (ACCUNEB) 0.63 MG/3ML nebulizer solution  Take 1 ampule by nebulization Every 6 (Six) Hours. 1 dose via trach every six hours related to chronic obstructive pulmonary disease             amoxicillin-clavulanate (AUGMENTIN) 250-62.5 MG/5ML suspension  10 mL by Per G Tube route Every 8 (Eight) Hours for 2 days. Indications: Pneumonia             arformoterol (BROVANA) 15 MCG/2ML nebulizer solution  Take  by nebulization 2 (Two) Times a Day. One dose via trach every twelve hours             aspirin 81 MG EC tablet  81 mg by Per G Tube route Daily.             B Complex-C-Folic Acid (JOY-MARLENY PO)  1 tablet by Per G Tube route Daily.             budesonide (PULMICORT) 0.5 MG/2ML nebulizer solution  Take 0.5 mg by nebulization 2 (Two) Times a Day. 1 puff inhale orally two times a day             carbidopa-levodopa (SINEMET)  MG per tablet  1 tablet by Per G Tube route Every 6 (Six) Hours.             diltiaZEM (CARDIZEM) 60 MG tablet  60 mg by Per G Tube route 3 (Three) Times a Day.             enoxaparin (LOVENOX) 40 MG/0.4ML solution syringe  Inject 40 mg under the skin Daily.             famotidine (PEPCID) 20 MG tablet  20 mg by Per G Tube route 2 (Two) Times a Day.             furosemide (LASIX) 20 MG tablet  20 mg by Per G Tube route Daily.             insulin detemir (LEVEMIR) 100 UNIT/ML injection  Inject 20 Units under the skin 2 (Two) Times a Day.             insulin NPH-insulin regular (NOVOLIN 70/30) (70-30) 100 UNIT/ML injection  Inject  under the skin  2 (Two) Times a Day With Meals. Sliding scale             ipratropium-albuterol (COMBIVENT)  MCG/ACT inhaler  Inhale 1 puff Every 6 (Six) Hours As Needed for Wheezing. 1 dose via trach every six hours as needed for SOA             Lanthanum Carbonate 750 MG pack  750 mg by Per G Tube route Every 8 (Eight) Hours.             metoprolol tartrate (LOPRESSOR) 25 MG tablet  25 mg by Per G Tube route 2 (Two) Times a Day.             ondansetron (ZOFRAN) 4 MG tablet  4 mg by Per G Tube route Every 4 (Four) Hours As Needed for Nausea or Vomiting.             polyethylene glycol (MIRALAX) packet  17 g by Per G Tube route Daily.             primidone (MYSOLINE) 50 MG tablet  Take 1 tablet by mouth 2 (Two) Times a Day.             tuberculin (TUBERSOL) 5 UNIT/0.1ML injection  Inject 5 Units into the skin Every Night.               Diet Instructions     Diet:       Diet Texture / Consistency:  Regular   Common Modifiers:   Cardiac  Consistent Carbohydrate                   Activity Instructions     Activity as Tolerated                 No future appointments.  Follow-up Information     Follow up with [facility] .    Specialty:  Skilled Nursing Facility    Contact information:    [address] [address] [phone]        Follow up with Eloy Cancino MD Follow up in 2 day(s).    Specialty:  Internal Medicine    Contact information:    [address] [facility] [address] [phone]          Follow up with Santana Latif MD Follow up in 1 month(s).    Specialty:  Pulmonary Disease    Why:  Or can keep previous appointment with pulmonologist at [facility].    Contact information:    [address] [facility] [id number] [phone]               Tian Caicedo MD  [facility]  03/22/17  2:52 PM      Time: greater than 30 minutes.      Dragon disclaimer:  Much of this encounter note is an electronic transcription/translation of spoken language  to printed text. The electronic translation of spoken language may permit erroneous, or at times, nonsensical words or phrases to be inadvertently transcribed; Although I have reviewed the note for such errors, some may still exist.

## 2017-03-22 NOTE — PLAN OF CARE
Problem: Patient Care Overview (Adult)  Goal: Plan of Care Review  Outcome: Ongoing (interventions implemented as appropriate)  Goal: Adult Individualization and Mutuality  Outcome: Ongoing (interventions implemented as appropriate)  Goal: Discharge Needs Assessment  Outcome: Ongoing (interventions implemented as appropriate)    Problem: Respiratory Insufficiency (Adult)  Goal: Acid/Base Balance  Outcome: Ongoing (interventions implemented as appropriate)  Goal: Effective Ventilation  Outcome: Ongoing (interventions implemented as appropriate)    Problem: Skin Integrity Impairment, Risk/Actual (Adult)  Goal: Skin Integrity/Wound Healing  Outcome: Ongoing (interventions implemented as appropriate)    Problem: Fall Risk (Adult)  Goal: Absence of Falls  Outcome: Ongoing (interventions implemented as appropriate)

## 2017-03-22 NOTE — SIGNIFICANT NOTE
03/22/17 0944   Rehab Treatment   Discipline physical therapy assistant   Treatment Not Performed patient unavailable for treatment  (Pt needs the bed mapn and cleaned up PT to check back in PM)   Recommendation   PT - Next Appointment 03/22/17

## 2017-03-22 NOTE — PROGRESS NOTES
Daily Progress Note.     Lucy Sevilla  56 y.o.  female  3/22/2017    Brief problem (summary)  This is a 56-year-old female with a complicated medical history. Normally she is cared by Dr. Hall at [facility] for her pulmonary problems and sleep apnea. She has been in and out of the hospital many times and this is #4 trach. Recently he did a bronchoscopy and told her that there is a skin overgrowth that needs to be lasered. He was planning to send her to [facility] for laser procedure but she ended up in [facility]. While she was in [place] she had a CODE BLUE and was resuscitated. The boyfriend reports that after the code patient developed this tremors. She was sent to a rehabilitation and she has been in the rehabilitation for 4 weeks when she found to have increasing lung condition, cough requiring 15 L of oxygen by trach mask and sent to the emergency room. The chest x-ray shows infiltrate consistent pneumonia and she has been started on Zosyn and vancomycin. Patient unable to give me much history unfortunately. She has a past medical history of sleep apnea, chronic hypoxic respiratory failure, atrial fibrillation, CHF, diabetes mellitus, hepatitis C, hypertension and hyperlipidemia.    Today, looks lot better    PMS/FH/SH: reviewed and unchanged.  Medications:     arformoterol 15 mcg Nebulization BID - RT   aspirin 81 mg Oral Daily   budesonide 0.5 mg Nebulization BID - RT   carbidopa-levodopa 1 tablet Per G Tube Q6H   diltiaZEM 60 mg Per G Tube TID   enoxaparin 30 mg Subcutaneous Daily   famotidine 10 mg Per G Tube BID   guaiFENesin 600 mg Oral BID   insulin aspart 0-7 Units Subcutaneous 4x Daily AC & at Bedtime   insulin detemir 20 Units Subcutaneous BID   lanthanum 500 mg Oral TID With Meals   metoprolol tartrate 25 mg Per G Tube BID   piperacillin-tazobactam 3.375 g Intravenous Q8H   polyethylene glycol 17 g Per G Tube Daily   primidone 50 mg Oral BID   vancomycin 1,250 mg Intravenous Q24H        Pharmacy to dose vancomycin        ROS:  Respiratory ROS: NA    Objective:  Temp:  [97.9 °F (36.6 °C)-98.3 °F (36.8 °C)] 98.3 °F (36.8 °C)  I/O last 3 completed shifts:  In: 210 [Other:160; IV Piggyback:50]  Out: 200 [Urine:200]       Physical Exam   Constitutional: She is oriented to person, place, and time. She appears well-developed and well-nourished.   HENT:   Head: Atraumatic.   Eyes: Pupils are equal, round, and reactive to light. No scleral icterus.   Neck:   Trach   Cardiovascular: Normal rate and regular rhythm.    Pulmonary/Chest: No accessory muscle usage. No respiratory distress. She has decreased breath sounds. She has no wheezes.   Abdominal: Soft. Normal appearance and bowel sounds are normal. She exhibits no ascites. There is no hepatosplenomegaly.   Neurological: She is alert and oriented to person, place, and time.   Skin: No laceration and no petechiae noted. No cyanosis. Nails show no clubbing.   Psychiatric: She has a normal mood and affect. Her behavior is normal.   Vitals reviewed.      Results from last 7 days  Lab Units 03/19/17  0719 03/18/17  0443 03/17/17  0600   WBC 10*3/mm3 6.05 8.20 6.89   HEMOGLOBIN g/dL 10.5* 10.2* 10.7*   HEMATOCRIT % 33.3* 32.1* 33.9*   PLATELETS 10*3/mm3 191 185 214       Results from last 7 days  Lab Units 03/22/17  0932 03/19/17  0719 03/18/17  0443 03/17/17  0600   SODIUM mmol/L  --  139 142 140   POTASSIUM mmol/L  --  4.4 4.0 4.3   CHLORIDE mmol/L  --  100 100 98   TOTAL CO2 mmol/L  --  25.5 28.2 25.8   BUN mg/dL  --  35* 41* 45*   CREATININE mg/dL 1.37* 1.43* 2.15* 1.79*   GLUCOSE mg/dL  --  137* 147* 140*   CALCIUM mg/dL  --  9.2 9.0 9.5           Results from last 7 days  Lab Units 03/18/17 2123   PH, ARTERIAL pH units 7.400   PO2 ART mm Hg 111.6*   PCO2, ARTERIAL mm Hg 51.6*   HCO3 ART mmol/L 32.0*       ASSESSMENT/ PLAN:  · Acute on chronic hypoxic respiratory failure, patient is requiring more oxygen than normal. Continue on weaning the  oxygen.  · Healthcare associated pneumonia, OK to DC Vancomycin and Zosyn. Charnge to Augmentin  · Tremors,  the boyfriend states that these started after the CODE BLUE at [place]. I will ask neurology to see her and assist  · History of sleep apnea, she has tracheostomy and does not require any positive pressures .  · Diabetes mellitus  · History of congestive heart failure  · Renal failure, improving Cr  · History of atrial fibrillation  · Hx of hepatitis C   · Parkinsonisum: on sinemet  · Vocal cord cyst or growth,     I will ask Bhavya to take look and she if can laser it out   The bronchoscopy picture is on the wall, in a plastic bag    Karl Sousa MD,[facility]  Pulmonary, Critical Care and Sleep Medicine.  [facility]    3/22/2017    EMR Dragon/Transcription disclaimer:   Much of this encounter note is an electronic transcription/translation of spoken language to printed text. The electronic translation of spoken language may permit erroneous, or at times, nonsensical words or phrases to be inadvertently transcribed; Although I have reviewed the note for such errors, some may still exist.

## 2017-03-22 NOTE — PLAN OF CARE
Problem: Patient Care Overview (Adult)  Goal: Plan of Care Review  Outcome: Ongoing (interventions implemented as appropriate)    03/22/17 0513   Coping/Psychosocial Response Interventions   Plan Of Care Reviewed With patient   Patient Care Overview   Progress improving   Outcome Evaluation   Outcome Summary/Follow up Plan Pt. had no complaints during to night. Was able to get up with walker and assistance to the BR. Prefers to take meds. per G-tube., trach suctioning done PRN .         Problem: Respiratory Insufficiency (Adult)  Goal: Acid/Base Balance  Outcome: Ongoing (interventions implemented as appropriate)    03/22/17 0513   Respiratory Insufficiency (Adult)   Acid/Base Balance making progress toward outcome       Goal: Effective Ventilation  Outcome: Ongoing (interventions implemented as appropriate)    03/22/17 0513   Respiratory Insufficiency (Adult)   Effective Ventilation making progress toward outcome         Problem: Skin Integrity Impairment, Risk/Actual (Adult)  Intervention: Promote/Optimize Nutrition    03/22/17 0513   Hygiene Care Assistance   Oral Care oral rinse provided       Intervention: Prevent/Manage Excess Moisture    03/22/17 0513   Skin Interventions   Skin Protection incontinence pads utilized;protective footwear used         Goal: Skin Integrity/Wound Healing  Outcome: Ongoing (interventions implemented as appropriate)    03/22/17 0513   Skin Integrity Impairment, Risk/Actual (Adult)   Skin Integrity/Wound Healing making progress toward outcome         Problem: Fall Risk (Adult)  Goal: Absence of Falls  Outcome: Ongoing (interventions implemented as appropriate)    03/22/17 0513   Fall Risk (Adult)   Absence of Falls making progress toward outcome

## 2017-03-22 NOTE — PAYOR COMM NOTE
"Sunday Sevilla (56 y.o. Female)                                                            REF# [id number]                                                          CONTACT=  ONEIL                                                           FAX   [phone]                                                             [phone]            Date of Birth Social Security Number Address Home Phone MRN    1960  [id number] [facility] [id number] [phone] [id number]    Congregation Marital Status          None        Admission Date Admission Type Admitting Provider Attending Provider Department, Room/Bed    3/16/17 Emergency Cedric Lemon MD Ray, Jonathan, MD [address], 611/1    Discharge Date Discharge Disposition Discharge Destination                      Attending Provider: Tian Caicedo MD     Allergies:  Codeine    Isolation:  None   Infection:  None   Code Status:  FULL    Ht:  66\" (167.6 cm)   Wt:  337 lb 9.6 oz (153 kg)    Admission Cmt:  None   Principal Problem:  Healthcare associated bacterial pneumonia - covering for gram-negative and MRSA organisms [J15.9]                 Active Insurance as of 3/16/2017     Primary Coverage     Payor Plan Insurance Group Employer/Plan Group    PASSPORT PASSPORT      Payor Plan Address Payor Plan Phone Number Effective From Effective To    [address] [phone] 1/1/1998     [place] [address]       Subscriber Name Subscriber Birth Date Member ID       SUNDAY SEVILLA 1960 [id number]                 Emergency Contacts      (Rel.) Home Phone Work Phone Mobile Phone    Jonathan Das (Power of ) -- [phone]            Insurance Information                PASSPORT/PASSPORT Phone: [phone]    Subscriber: Sunday Sevilla Subscriber#: [id number]    Group#:  Precert#:           Vital Signs (last 24 hours)       03/21 0700  -  03/22 0659 03/22 0700  -  03/22 1303   " Most Recent    Temp (°F) 97.9 -  98.2      98.3     98.3 (36.8)    Heart Rate 60 -  99      65     65    Resp 18 -  20      18     18    /85 -  144/87      123/67     123/67    SpO2 (%) 95 -  100      100     100          Hospital Medications (active)       Dose Frequency Start End    acetaminophen (TYLENOL) tablet 650 mg 650 mg Every 6 Hours PRN 3/16/2017     Sig - Route: 2 tablets by Per G Tube route Every 6 (Six) Hours As Needed for Mild Pain (1-3). - Per G Tube    amoxicillin-clavulanate (AUGMENTIN) 250-62.5 MG/5ML suspension 500 mg 500 mg Every 8 Hours Scheduled 3/22/2017     Sig - Route: 10 mL by Per G Tube route Every 8 (Eight) Hours. - Per G Tube    arformoterol (BROVANA) nebulizer solution 15 mcg 15 mcg 2 Times Daily - RT 3/16/2017     Sig - Route: Take 2 mL by nebulization 2 (Two) Times a Day. - Nebulization    aspirin EC tablet 81 mg 81 mg Daily 3/16/2017     Sig - Route: Take 1 tablet by mouth Daily. - Oral    budesonide (PULMICORT) nebulizer solution 0.5 mg 0.5 mg 2 Times Daily - RT 3/16/2017     Sig - Route: Take 2 mL by nebulization 2 (Two) Times a Day. - Nebulization    carbidopa-levodopa (SINEMET)  MG per tablet 1 tablet 1 tablet Every 6 Hours Scheduled 3/18/2017     Sig - Route: 1 tablet by Per G Tube route Every 6 (Six) Hours. - Per G Tube    dextrose (D50W) solution 25 g 25 g Every 15 Minutes PRN 3/16/2017     Sig - Route: Infuse 50 mL into a venous catheter Every 15 (Fifteen) Minutes As Needed for Low Blood Sugar (Blood Sugar Less Than 70, Patient Has IV Access - Unresponsive, NPO or Unable To Safely Swallow). - Intravenous    dextrose (GLUTOSE) oral gel 15 g 15 g Every 15 Minutes PRN 3/16/2017     Sig - Route: Take 15 g by mouth Every 15 (Fifteen) Minutes As Needed for Low Blood Sugar (Blood Sugar Less Than 70, Patient Alert, Is Not NPO & Can Safely Swallow). - Oral    diltiaZEM (CARDIZEM) tablet 60 mg 60 mg 3 Times Daily 3/16/2017     Sig - Route: 1 tablet by Per G Tube route 3  (Three) Times a Day. - Per G Tube    enoxaparin (LOVENOX) syringe 30 mg 30 mg Daily 3/16/2017     Sig - Route: Inject 0.3 mL under the skin Daily. - Subcutaneous    famotidine (PEPCID) tablet 10 mg 10 mg 2 Times Daily 3/16/2017     Sig - Route: 1 tablet by Per G Tube route 2 (Two) Times a Day. - Per G Tube    glucagon (human recombinant) (GLUCAGEN DIAGNOSTIC) injection 1 mg 1 mg Every 15 Minutes PRN 3/16/2017     Sig - Route: Inject 1 mg under the skin Every 15 (Fifteen) Minutes As Needed (Blood Glucose Less Than 70 - Patient Without IV Access - Unresponsive, NPO or Unable To Safely Swallow). - Subcutaneous    guaiFENesin (MUCINEX) 12 hr tablet 600 mg 600 mg 2 Times Daily 3/16/2017     Sig - Route: Take 1 tablet by mouth 2 (Two) Times a Day. - Oral    haloperidol (HALDOL) tablet 2.5 mg 2.5 mg 2 Times Daily PRN 3/16/2017     Sig - Route: 2.5 mg by Per G Tube route 2 (Two) Times a Day As Needed for Agitation. - Per G Tube    insulin aspart (novoLOG) injection 0-7 Units 0-7 Units 4 Times Daily Before Meals & Nightly 3/16/2017     Sig - Route: Inject 0-7 Units under the skin 4 (Four) Times a Day Before Meals & at Bedtime. - Subcutaneous    insulin detemir (LEVEMIR) injection 20 Units 20 Units 2 Times Daily 3/16/2017     Sig - Route: Inject 20 Units under the skin 2 (Two) Times a Day. - Subcutaneous    ipratropium-albuterol (DUO-NEB) nebulizer solution 3 mL 3 mL Every 2 Hours PRN 3/19/2017     Sig - Route: Take 3 mL by nebulization Every 2 (Two) Hours As Needed for Shortness of Air. - Nebulization    lanthanum (FOSRENOL) chewable tablet 500 mg 500 mg 3 Times Daily With Meals 3/16/2017     Sig - Route: Chew 1 tablet 3 (Three) Times a Day With Meals. - Oral    metoprolol tartrate (LOPRESSOR) tablet 25 mg 25 mg 2 Times Daily 3/16/2017     Sig - Route: 1 tablet by Per G Tube route 2 (Two) Times a Day. - Per G Tube    ondansetron (ZOFRAN) tablet 4 mg 4 mg Every 4 Hours PRN 3/16/2017     Sig - Route: 1 tablet by Per G Tube  "route Every 4 (Four) Hours As Needed for Nausea or Vomiting. - Per G Tube    polyethylene glycol (MIRALAX) powder 17 g 17 g Daily 3/16/2017     Sig - Route: 17 g by Per G Tube route Daily. - Per G Tube    primidone (MYSOLINE) tablet 50 mg 50 mg 2 Times Daily 3/17/2017     Sig - Route: Take 1 tablet by mouth 2 (Two) Times a Day. - Oral    sodium chloride 0.9 % flush 1-10 mL 1-10 mL As Needed 3/16/2017     Sig - Route: Infuse 1-10 mL into a venous catheter As Needed for Line Care. - Intravenous    sodium chloride 0.9 % flush 10 mL 10 mL As Needed 3/16/2017     Sig - Route: Infuse 10 mL into a venous catheter As Needed for Line Care. - Intravenous    Cosign for Ordering: Accepted by Harjit Emmanuel MD on 3/16/2017  3:02 AM    Linked Group 1:  \"And\" Linked Group Details        traMADol (ULTRAM) tablet 50 mg 50 mg Every 6 Hours PRN 3/16/2017     Sig - Route: 1 tablet by Per G Tube route Every 6 (Six) Hours As Needed for Moderate Pain (4-6). - Per G Tube    Pharmacy to dose vancomycin (Discontinued)  Continuous PRN 3/16/2017 3/22/2017    Sig - Route: Continuous As Needed for Consult. - Does not apply    piperacillin-tazobactam (ZOSYN) 3.375 g in dextrose 50 mL IVPB (premix) (Discontinued) 3.375 g Every 8 Hours 3/16/2017 3/22/2017    Sig - Route: Infuse 50 mL into a venous catheter Every 8 (Eight) Hours. - Intravenous    vancomycin 1250 mg/250 mL 0.9% NS IVPB ([facility]) (Discontinued) 1,250 mg Every 24 Hours 3/22/2017 3/22/2017    Sig - Route: Infuse 250 mL into a venous catheter Daily. - Intravenous    vancomycin 2000 mg/500 mL 0.9% NS IVPB ([facility]) (Discontinued) 2,000 mg Every 24 Hours 3/19/2017 3/22/2017    Sig - Route: Infuse 500 mL into a venous catheter Daily. - Intravenous    Reason for Discontinue: Dose adjustment            Lab Results (last 24 hours)     Procedure Component Value Units Date/Time    POC Glucose Fingerstick [[id number]]  (Normal) Collected:  03/21/17 1712    Specimen:  Blood Updated:  03/21/17 1713     " Glucose 126 mg/dL     Narrative:       Meter: [id number] : [id number] Nenita Francois    POC Glucose Fingerstick [[id number]]  (Abnormal) Collected:  03/22/17 0021    Specimen:  Blood Updated:  03/22/17 0022     Glucose 166 (H) mg/dL     Narrative:       Meter: [id number] : [id number] Zahida Gil    Vancomycin, Trough Vancomycin dose due at 1000. Please make sure vancomycin is not infusing before drawing the vancomycin level. [[id number]]  (Abnormal) Collected:  03/22/17 0932    Specimen:  Blood Updated:  03/22/17 1031     Vancomycin Trough 22.90 (H) mcg/mL     Creatinine, Serum [[id number]]  (Abnormal) Collected:  03/22/17 0932    Specimen:  Blood Updated:  03/22/17 1129     Creatinine 1.37 (H) mg/dL      eGFR Non African Amer 40 (L) mL/min/1.73     POC Glucose Fingerstick [[id number]]  (Normal) Collected:  03/22/17 1150    Specimen:  Blood Updated:  03/22/17 1154     Glucose 108 mg/dL     Narrative:       Meter: [id number] : [id number] Dexter Anderson           Physician Progress Notes (last 24 hours) (Notes from 3/21/2017  1:07 PM through 3/22/2017  1:07 PM)      Karl Sousa MD at 3/21/2017  5:37 PM  Version 1 of 1           Daily Progress Note.     Lucy Sevilla  56 y.o.  female  3/21/2017    Brief problem (summary)  This is a 56-year-old female with a complicated medical history. Normally she is cared by Dr. Hall at [place] for her pulmonary problems and sleep apnea. She has been in and out of the hospital many times and this is #4 trach. Recently he did a bronchoscopy and told her that there is a skin overgrowth that needs to be lasered. He was planning to send her to [facility] for laser procedure but she ended up in [facility]. While she was in [place] she had a CODE BLUE and was resuscitated. The boyfriend reports that after the code patient developed this tremors. She was sent to a rehabilitation and she has been in the rehabilitation for 4 weeks when she found to have increasing  lung condition, cough requiring 15 L of oxygen by trach mask and sent to the emergency room. The chest x-ray shows infiltrate consistent pneumonia and she has been started on Zosyn and vancomycin. Patient unable to give me much history unfortunately. She has a past medical history of sleep apnea, chronic hypoxic respiratory failure, atrial fibrillation, CHF, diabetes mellitus, hepatitis C, hypertension and hyperlipidemia.    Today, looks lot better    PMS/FH/SH: reviewed and unchanged.  Medications:     arformoterol 15 mcg Nebulization BID - RT   aspirin 81 mg Oral Daily   budesonide 0.5 mg Nebulization BID - RT   carbidopa-levodopa 1 tablet Per G Tube Q6H   diltiaZEM 60 mg Per G Tube TID   enoxaparin 30 mg Subcutaneous Daily   famotidine 10 mg Per G Tube BID   guaiFENesin 600 mg Oral BID   insulin aspart 0-7 Units Subcutaneous 4x Daily AC & at Bedtime   insulin detemir 20 Units Subcutaneous BID   lanthanum 500 mg Oral TID With Meals   metoprolol tartrate 25 mg Per G Tube BID   piperacillin-tazobactam 3.375 g Intravenous Q8H   polyethylene glycol 17 g Per G Tube Daily   primidone 50 mg Oral BID   vancomycin 2,000 mg Intravenous Q24H       Pharmacy to dose vancomycin        ROS:  Respiratory ROS: NA    Objective:  Temp:  [97.4 °F (36.3 °C)-97.9 °F (36.6 °C)] 97.9 °F (36.6 °C)  I/O last 3 completed shifts:  In: 440 [P.O.:240; Other:50; NG/GT:100; IV Piggyback:50]  Out: -   I/O this shift:  In: 110 [Other:110]  Out: -     Physical Exam   Constitutional: She is oriented to person, place, and time. She appears well-developed and well-nourished.   HENT:   Head: Atraumatic.   Eyes: Pupils are equal, round, and reactive to light. No scleral icterus.   Neck:   Trach   Cardiovascular: Normal rate and regular rhythm.    Pulmonary/Chest: No accessory muscle usage. No respiratory distress. She has decreased breath sounds. She has no wheezes.   Abdominal: Soft. Normal appearance and bowel sounds are normal. She exhibits no  ascites. There is no hepatosplenomegaly.   Neurological: She is alert and oriented to person, place, and time.   Skin: No laceration and no petechiae noted. No cyanosis. Nails show no clubbing.   Psychiatric: She has a normal mood and affect. Her behavior is normal.   Vitals reviewed.      ASSESSMENT/ PLAN:  · Acute on chronic hypoxic respiratory failure, patient is requiring more oxygen than normal. Continue on weaning the oxygen.  · Healthcare associated pneumonia, I agree with antibiotics choice. Patient is being in the hospital, nursing homes for most part of the year.  · Tremors,  the boyfriend states that these started after the CODE BLUE at [place]. I will ask neurology to see her and assist  · History of sleep apnea, she has tracheostomy and does not require any positive pressures .  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  [facility]    3/21/2017    EMR Dragon/Transcription disclaimer:   Much of this encounter note is an electronic transcription/translation of spoken language to printed text. The electronic translation of spoken language may permit erroneous, or at times, nonsensical words or phrases to be inadvertently transcribed; Although I have reviewed the note for such errors, some may still exist.        Electronically signed by Karl Sousa MD at 3/21/2017  5:38 PM      Karl Sousa MD at 3/22/2017 11:51 AM  Version 2 of 2           Daily Progress Note.     Lucy Sevilla  56 y.o.  female  3/22/2017    Brief problem (summary)  This is a 56-year-old female with a complicated medical history. Normally she is cared by Dr. Hall at [place] for her  pulmonary problems and sleep apnea. She has been in and out of the hospital many times and this is #4 trach. Recently he did a bronchoscopy and told her that there is a skin overgrowth that needs to be lasered. He was planning to send her to [facility] for laser procedure but she ended up in [facility]. While she was in [place] she had a CODE BLUE and was resuscitated. The boyfriend reports that after the code patient developed this tremors. She was sent to a rehabilitation and she has been in the rehabilitation for 4 weeks when she found to have increasing lung condition, cough requiring 15 L of oxygen by trach mask and sent to the emergency room. The chest x-ray shows infiltrate consistent pneumonia and she has been started on Zosyn and vancomycin. Patient unable to give me much history unfortunately. She has a past medical history of sleep apnea, chronic hypoxic respiratory failure, atrial fibrillation, CHF, diabetes mellitus, hepatitis C, hypertension and hyperlipidemia.    Today, looks lot better    PMS/FH/SH: reviewed and unchanged.  Medications:     arformoterol 15 mcg Nebulization BID - RT   aspirin 81 mg Oral Daily   budesonide 0.5 mg Nebulization BID - RT   carbidopa-levodopa 1 tablet Per G Tube Q6H   diltiaZEM 60 mg Per G Tube TID   enoxaparin 30 mg Subcutaneous Daily   famotidine 10 mg Per G Tube BID   guaiFENesin 600 mg Oral BID   insulin aspart 0-7 Units Subcutaneous 4x Daily AC & at Bedtime   insulin detemir 20 Units Subcutaneous BID   lanthanum 500 mg Oral TID With Meals   metoprolol tartrate 25 mg Per G Tube BID   piperacillin-tazobactam 3.375 g Intravenous Q8H   polyethylene glycol 17 g Per G Tube Daily   primidone 50 mg Oral BID   vancomycin 1,250 mg Intravenous Q24H       Pharmacy to dose vancomycin        ROS:  Respiratory ROS: NA    Objective:  Temp:  [97.9 °F (36.6 °C)-98.3 °F (36.8 °C)] 98.3 °F (36.8 °C)  I/O last 3 completed shifts:  In: 210 [Other:160; IV Piggyback:50]  Out:  200 [Urine:200]       Physical Exam   Constitutional: She is oriented to person, place, and time. She appears well-developed and well-nourished.   HENT:   Head: Atraumatic.   Eyes: Pupils are equal, round, and reactive to light. No scleral icterus.   Neck:   Trach   Cardiovascular: Normal rate and regular rhythm.    Pulmonary/Chest: No accessory muscle usage. No respiratory distress. She has decreased breath sounds. She has no wheezes.   Abdominal: Soft. Normal appearance and bowel sounds are normal. She exhibits no ascites. There is no hepatosplenomegaly.   Neurological: She is alert and oriented to person, place, and time.   Skin: No laceration and no petechiae noted. No cyanosis. Nails show no clubbing.   Psychiatric: She has a normal mood and affect. Her behavior is normal.   Vitals reviewed.      Results from last 7 days  Lab Units 03/19/17  0719 03/18/17  0443 03/17/17  0600   WBC 10*3/mm3 6.05 8.20 6.89   HEMOGLOBIN g/dL 10.5* 10.2* 10.7*   HEMATOCRIT % 33.3* 32.1* 33.9*   PLATELETS 10*3/mm3 191 185 214       Results from last 7 days  Lab Units 03/22/17  0932 03/19/17  0719 03/18/17  0443 03/17/17  0600   SODIUM mmol/L  --  139 142 140   POTASSIUM mmol/L  --  4.4 4.0 4.3   CHLORIDE mmol/L  --  100 100 98   TOTAL CO2 mmol/L  --  25.5 28.2 25.8   BUN mg/dL  --  35* 41* 45*   CREATININE mg/dL 1.37* 1.43* 2.15* 1.79*   GLUCOSE mg/dL  --  137* 147* 140*   CALCIUM mg/dL  --  9.2 9.0 9.5           Results from last 7 days  Lab Units 03/18/17  2123   PH, ARTERIAL pH units 7.400   PO2 ART mm Hg 111.6*   PCO2, ARTERIAL mm Hg 51.6*   HCO3 ART mmol/L 32.0*       ASSESSMENT/ PLAN:  · Acute on chronic hypoxic respiratory failure, patient is requiring more oxygen than normal. Continue on weaning the oxygen.  · Healthcare associated pneumonia, OK to DC Vancomycin and Zosyn. Charnge to Augmentin  · Tremors,  the boyfriend states that these started after the CODE BLUE at [place]. I will ask neurology to see her and  assist  · History of sleep apnea, she has tracheostomy and does not require any positive pressures .  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  Pulmonary, Critical Care and Sleep Medicine.  [facility]    3/22/2017    EMR Dragon/Transcription disclaimer:   Much of this encounter note is an electronic transcription/translation of spoken language to printed text. The electronic translation of spoken language may permit erroneous, or at times, nonsensical words or phrases to be inadvertently transcribed; Although I have reviewed the note for such errors, some may still exist.        Electronically signed by Karl Sousa MD at 3/22/2017 12:19 PM      Karl Sousa MD at 3/22/2017 11:51 AM  Version 1 of 2           Daily Progress Note.     Lucy Sevilla  56 y.o.  female  3/22/2017    Brief problem (summary)  This is a 56-year-old female with a complicated medical history. Normally she is cared by Dr. Hall at [place] for her pulmonary problems and sleep apnea. She has been in and out of the hospital many times and this is #4 trach. Recently he did a bronchoscopy and told her that there is a skin overgrowth that needs to be lasered. He was planning to send her to [facility] for laser procedure but she ended up in [facility]. While she was in [place] she had a CODE BLUE and was resuscitated. The boyfriend reports that after the code patient developed this tremors. She was sent to a rehabilitation and she has been in the rehabilitation for 4 weeks when she found to have increasing lung condition, cough requiring 15 L of oxygen by trach mask and sent to  the emergency room. The chest x-ray shows infiltrate consistent pneumonia and she has been started on Zosyn and vancomycin. Patient unable to give me much history unfortunately. She has a past medical history of sleep apnea, chronic hypoxic respiratory failure, atrial fibrillation, CHF, diabetes mellitus, hepatitis C, hypertension and hyperlipidemia.    Today, looks lot better    PMS/FH/SH: reviewed and unchanged.  Medications:     arformoterol 15 mcg Nebulization BID - RT   aspirin 81 mg Oral Daily   budesonide 0.5 mg Nebulization BID - RT   carbidopa-levodopa 1 tablet Per G Tube Q6H   diltiaZEM 60 mg Per G Tube TID   enoxaparin 30 mg Subcutaneous Daily   famotidine 10 mg Per G Tube BID   guaiFENesin 600 mg Oral BID   insulin aspart 0-7 Units Subcutaneous 4x Daily AC & at Bedtime   insulin detemir 20 Units Subcutaneous BID   lanthanum 500 mg Oral TID With Meals   metoprolol tartrate 25 mg Per G Tube BID   piperacillin-tazobactam 3.375 g Intravenous Q8H   polyethylene glycol 17 g Per G Tube Daily   primidone 50 mg Oral BID   vancomycin 1,250 mg Intravenous Q24H       Pharmacy to dose vancomycin        ROS:  Respiratory ROS: NA    Objective:  Temp:  [97.9 °F (36.6 °C)-98.3 °F (36.8 °C)] 98.3 °F (36.8 °C)  I/O last 3 completed shifts:  In: 210 [Other:160; IV Piggyback:50]  Out: 200 [Urine:200]       Physical Exam   Constitutional: She is oriented to person, place, and time. She appears well-developed and well-nourished.   HENT:   Head: Atraumatic.   Eyes: Pupils are equal, round, and reactive to light. No scleral icterus.   Neck:   Trach   Cardiovascular: Normal rate and regular rhythm.    Pulmonary/Chest: No accessory muscle usage. No respiratory distress. She has decreased breath sounds. She has no wheezes.   Abdominal: Soft. Normal appearance and bowel sounds are normal. She exhibits no ascites. There is no hepatosplenomegaly.   Neurological: She is alert and oriented to person, place, and time.   Skin: No  laceration and no petechiae noted. No cyanosis. Nails show no clubbing.   Psychiatric: She has a normal mood and affect. Her behavior is normal.   Vitals reviewed.      R  Results from last 7 days  Lab Units 03/22/17  0932   SODIUM mmol/L  --    POTASSIUM mmol/L  --    CHLORIDE mmol/L  --    TOTAL CO2 mmol/L  --    BUN mg/dL  --    CREATININE mg/dL 1.37*   GLUCOSE mg/dL  --    CALCIUM mg/dL  --              ASSESSMENT/ PLAN:  · Acute on chronic hypoxic respiratory failure, patient is requiring more oxygen than normal. Continue on weaning the oxygen.  · Healthcare associated pneumonia, I agree with antibiotics choice. Patient is being in the hospital, nursing homes for most part of the year.  · Tremors,  the boyfriend states that these started after the CODE BLUE at [place]. I will ask neurology to see her and assist  · History of sleep apnea, she has tracheostomy and does not require any positive pressures .  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  Pulmonary, Critical Care and Sleep Medicine.  [facility]    3/22/2017    EMR Dragon/Transcription disclaimer:   Much of this encounter note is an electronic transcription/translation of spoken language to printed text. The electronic translation of spoken language may permit erroneous, or at times, nonsensical words or phrases to be inadvertently transcribed; Although I have reviewed the note for such errors, some may still exist.        Electronically signed by Karl Sousa MD at 3/22/2017 11:53 AM

## 2017-03-22 NOTE — SIGNIFICANT NOTE
03/22/17 1331   Rehab Treatment   Discipline physical therapy assistant   Treatment Not Performed patient unavailable for treatment  (Pt is on the bed pan and wants PT to come back next treatment)   Recommendation   PT - Next Appointment 03/24/17

## 2020-12-24 ENCOUNTER — INPATIENT HOSPITAL (OUTPATIENT)
Dept: URBAN - METROPOLITAN AREA HOSPITAL 107 | Facility: HOSPITAL | Age: 60
End: 2020-12-24
Payer: COMMERCIAL

## 2020-12-24 DIAGNOSIS — R11.0 NAUSEA: ICD-10-CM

## 2020-12-24 DIAGNOSIS — R93.3 ABNORMAL FINDINGS ON DIAGNOSTIC IMAGING OF OTHER PARTS OF DI: ICD-10-CM

## 2020-12-24 PROCEDURE — 99222 1ST HOSP IP/OBS MODERATE 55: CPT | Performed by: INTERNAL MEDICINE

## 2020-12-25 PROCEDURE — 99231 SBSQ HOSP IP/OBS SF/LOW 25: CPT | Performed by: INTERNAL MEDICINE

## 2020-12-26 ENCOUNTER — INPATIENT HOSPITAL (OUTPATIENT)
Dept: URBAN - METROPOLITAN AREA HOSPITAL 107 | Facility: HOSPITAL | Age: 60
End: 2020-12-26
Payer: COMMERCIAL

## 2020-12-26 DIAGNOSIS — R93.3 ABNORMAL FINDINGS ON DIAGNOSTIC IMAGING OF OTHER PARTS OF DI: ICD-10-CM

## 2020-12-26 DIAGNOSIS — Z93.1 GASTROSTOMY STATUS: ICD-10-CM

## 2020-12-26 DIAGNOSIS — R11.10 VOMITING, UNSPECIFIED: ICD-10-CM

## 2020-12-26 DIAGNOSIS — K31.1 ADULT HYPERTROPHIC PYLORIC STENOSIS: ICD-10-CM

## 2020-12-26 PROCEDURE — 99231 SBSQ HOSP IP/OBS SF/LOW 25: CPT

## 2020-12-27 ENCOUNTER — INPATIENT HOSPITAL (OUTPATIENT)
Dept: URBAN - METROPOLITAN AREA HOSPITAL 107 | Facility: HOSPITAL | Age: 60
End: 2020-12-27
Payer: COMMERCIAL

## 2020-12-27 DIAGNOSIS — B18.2 CHRONIC VIRAL HEPATITIS C: ICD-10-CM

## 2020-12-27 DIAGNOSIS — R11.10 VOMITING, UNSPECIFIED: ICD-10-CM

## 2020-12-27 DIAGNOSIS — Z93.1 GASTROSTOMY STATUS: ICD-10-CM

## 2020-12-27 PROCEDURE — 99231 SBSQ HOSP IP/OBS SF/LOW 25: CPT

## 2020-12-28 ENCOUNTER — INPATIENT HOSPITAL (OUTPATIENT)
Dept: URBAN - METROPOLITAN AREA HOSPITAL 107 | Facility: HOSPITAL | Age: 60
End: 2020-12-28
Payer: COMMERCIAL

## 2020-12-28 DIAGNOSIS — R11.2 NAUSEA WITH VOMITING, UNSPECIFIED: ICD-10-CM

## 2020-12-28 PROCEDURE — 99231 SBSQ HOSP IP/OBS SF/LOW 25: CPT
